# Patient Record
Sex: MALE | Race: WHITE | NOT HISPANIC OR LATINO
[De-identification: names, ages, dates, MRNs, and addresses within clinical notes are randomized per-mention and may not be internally consistent; named-entity substitution may affect disease eponyms.]

---

## 2021-02-05 ENCOUNTER — TRANSCRIPTION ENCOUNTER (OUTPATIENT)
Age: 76
End: 2021-02-05

## 2021-02-15 ENCOUNTER — TRANSCRIPTION ENCOUNTER (OUTPATIENT)
Age: 76
End: 2021-02-15

## 2023-05-03 ENCOUNTER — INPATIENT (INPATIENT)
Facility: HOSPITAL | Age: 78
LOS: 11 days | Discharge: HOME | DRG: 865 | End: 2023-05-15
Attending: HOSPITALIST | Admitting: INTERNAL MEDICINE
Payer: MEDICARE

## 2023-05-03 VITALS
TEMPERATURE: 98 F | SYSTOLIC BLOOD PRESSURE: 117 MMHG | OXYGEN SATURATION: 100 % | WEIGHT: 169.98 LBS | RESPIRATION RATE: 18 BRPM | DIASTOLIC BLOOD PRESSURE: 80 MMHG | HEART RATE: 90 BPM

## 2023-05-03 DIAGNOSIS — M10.9 GOUT, UNSPECIFIED: ICD-10-CM

## 2023-05-03 DIAGNOSIS — Z29.9 ENCOUNTER FOR PROPHYLACTIC MEASURES, UNSPECIFIED: ICD-10-CM

## 2023-05-03 DIAGNOSIS — K21.9 GASTRO-ESOPHAGEAL REFLUX DISEASE WITHOUT ESOPHAGITIS: ICD-10-CM

## 2023-05-03 DIAGNOSIS — R41.82 ALTERED MENTAL STATUS, UNSPECIFIED: ICD-10-CM

## 2023-05-03 DIAGNOSIS — L89.159 PRESSURE ULCER OF SACRAL REGION, UNSPECIFIED STAGE: ICD-10-CM

## 2023-05-03 DIAGNOSIS — N40.0 BENIGN PROSTATIC HYPERPLASIA WITHOUT LOWER URINARY TRACT SYMPTOMS: ICD-10-CM

## 2023-05-03 DIAGNOSIS — N18.9 CHRONIC KIDNEY DISEASE, UNSPECIFIED: ICD-10-CM

## 2023-05-03 DIAGNOSIS — I48.91 UNSPECIFIED ATRIAL FIBRILLATION: ICD-10-CM

## 2023-05-03 DIAGNOSIS — E78.5 HYPERLIPIDEMIA, UNSPECIFIED: ICD-10-CM

## 2023-05-03 DIAGNOSIS — F03.90 UNSPECIFIED DEMENTIA, UNSPECIFIED SEVERITY, WITHOUT BEHAVIORAL DISTURBANCE, PSYCHOTIC DISTURBANCE, MOOD DISTURBANCE, AND ANXIETY: ICD-10-CM

## 2023-05-03 DIAGNOSIS — R45.1 RESTLESSNESS AND AGITATION: ICD-10-CM

## 2023-05-03 DIAGNOSIS — K59.00 CONSTIPATION, UNSPECIFIED: ICD-10-CM

## 2023-05-03 DIAGNOSIS — D64.9 ANEMIA, UNSPECIFIED: ICD-10-CM

## 2023-05-03 LAB
ALBUMIN SERPL ELPH-MCNC: 3 G/DL — LOW (ref 3.3–5)
ALP SERPL-CCNC: 66 U/L — SIGNIFICANT CHANGE UP (ref 40–120)
ALT FLD-CCNC: 24 U/L — SIGNIFICANT CHANGE UP (ref 12–78)
ANION GAP SERPL CALC-SCNC: 7 MMOL/L — SIGNIFICANT CHANGE UP (ref 5–17)
APPEARANCE UR: CLEAR — SIGNIFICANT CHANGE UP
APTT BLD: 29.9 SEC — SIGNIFICANT CHANGE UP (ref 27.5–35.5)
AST SERPL-CCNC: 16 U/L — SIGNIFICANT CHANGE UP (ref 15–37)
BASOPHILS # BLD AUTO: 0.05 K/UL — SIGNIFICANT CHANGE UP (ref 0–0.2)
BASOPHILS NFR BLD AUTO: 0.9 % — SIGNIFICANT CHANGE UP (ref 0–2)
BILIRUB SERPL-MCNC: 0.5 MG/DL — SIGNIFICANT CHANGE UP (ref 0.2–1.2)
BILIRUB UR-MCNC: NEGATIVE — SIGNIFICANT CHANGE UP
BUN SERPL-MCNC: 33 MG/DL — HIGH (ref 7–23)
CALCIUM SERPL-MCNC: 8.8 MG/DL — SIGNIFICANT CHANGE UP (ref 8.5–10.1)
CHLORIDE SERPL-SCNC: 115 MMOL/L — HIGH (ref 96–108)
CO2 SERPL-SCNC: 20 MMOL/L — LOW (ref 22–31)
COLOR SPEC: YELLOW — SIGNIFICANT CHANGE UP
CREAT SERPL-MCNC: 2.3 MG/DL — HIGH (ref 0.5–1.3)
CRP SERPL-MCNC: 3 MG/L — SIGNIFICANT CHANGE UP
DIFF PNL FLD: ABNORMAL
EGFR: 29 ML/MIN/1.73M2 — LOW
EOSINOPHIL # BLD AUTO: 0.27 K/UL — SIGNIFICANT CHANGE UP (ref 0–0.5)
EOSINOPHIL NFR BLD AUTO: 4.6 % — SIGNIFICANT CHANGE UP (ref 0–6)
GLUCOSE SERPL-MCNC: 94 MG/DL — SIGNIFICANT CHANGE UP (ref 70–99)
GLUCOSE UR QL: NEGATIVE — SIGNIFICANT CHANGE UP
HCT VFR BLD CALC: 27.5 % — LOW (ref 39–50)
HGB BLD-MCNC: 8.6 G/DL — LOW (ref 13–17)
HPIV3 RNA SPEC QL NAA+PROBE: DETECTED
IMM GRANULOCYTES NFR BLD AUTO: 0.5 % — SIGNIFICANT CHANGE UP (ref 0–0.9)
INR BLD: 1.14 RATIO — SIGNIFICANT CHANGE UP (ref 0.88–1.16)
KETONES UR-MCNC: NEGATIVE — SIGNIFICANT CHANGE UP
LACTATE SERPL-SCNC: 1.4 MMOL/L — SIGNIFICANT CHANGE UP (ref 0.7–2)
LEUKOCYTE ESTERASE UR-ACNC: ABNORMAL
LYMPHOCYTES # BLD AUTO: 1.02 K/UL — SIGNIFICANT CHANGE UP (ref 1–3.3)
LYMPHOCYTES # BLD AUTO: 17.4 % — SIGNIFICANT CHANGE UP (ref 13–44)
MCHC RBC-ENTMCNC: 28.9 PG — SIGNIFICANT CHANGE UP (ref 27–34)
MCHC RBC-ENTMCNC: 31.3 GM/DL — LOW (ref 32–36)
MCV RBC AUTO: 92.3 FL — SIGNIFICANT CHANGE UP (ref 80–100)
MONOCYTES # BLD AUTO: 0.33 K/UL — SIGNIFICANT CHANGE UP (ref 0–0.9)
MONOCYTES NFR BLD AUTO: 5.6 % — SIGNIFICANT CHANGE UP (ref 2–14)
NEUTROPHILS # BLD AUTO: 4.17 K/UL — SIGNIFICANT CHANGE UP (ref 1.8–7.4)
NEUTROPHILS NFR BLD AUTO: 71 % — SIGNIFICANT CHANGE UP (ref 43–77)
NITRITE UR-MCNC: NEGATIVE — SIGNIFICANT CHANGE UP
NRBC # BLD: 0 /100 WBCS — SIGNIFICANT CHANGE UP (ref 0–0)
PH UR: 5 — SIGNIFICANT CHANGE UP (ref 5–8)
PLATELET # BLD AUTO: 190 K/UL — SIGNIFICANT CHANGE UP (ref 150–400)
POTASSIUM SERPL-MCNC: 3.9 MMOL/L — SIGNIFICANT CHANGE UP (ref 3.5–5.3)
POTASSIUM SERPL-SCNC: 3.9 MMOL/L — SIGNIFICANT CHANGE UP (ref 3.5–5.3)
PROCALCITONIN SERPL-MCNC: 0.07 NG/ML — SIGNIFICANT CHANGE UP
PROT SERPL-MCNC: 6.2 G/DL — SIGNIFICANT CHANGE UP (ref 6–8.3)
PROT UR-MCNC: 100
PROTHROM AB SERPL-ACNC: 13.4 SEC — SIGNIFICANT CHANGE UP (ref 10.5–13.4)
RAPID RVP RESULT: DETECTED
RBC # BLD: 2.98 M/UL — LOW (ref 4.2–5.8)
RBC # FLD: 16 % — HIGH (ref 10.3–14.5)
SARS-COV-2 RNA SPEC QL NAA+PROBE: SIGNIFICANT CHANGE UP
SODIUM SERPL-SCNC: 142 MMOL/L — SIGNIFICANT CHANGE UP (ref 135–145)
SP GR SPEC: 1.01 — SIGNIFICANT CHANGE UP (ref 1.01–1.02)
UROBILINOGEN FLD QL: NEGATIVE — SIGNIFICANT CHANGE UP
WBC # BLD: 5.87 K/UL — SIGNIFICANT CHANGE UP (ref 3.8–10.5)
WBC # FLD AUTO: 5.87 K/UL — SIGNIFICANT CHANGE UP (ref 3.8–10.5)

## 2023-05-03 PROCEDURE — 99285 EMERGENCY DEPT VISIT HI MDM: CPT

## 2023-05-03 PROCEDURE — 70450 CT HEAD/BRAIN W/O DYE: CPT | Mod: 26,MA

## 2023-05-03 PROCEDURE — 71045 X-RAY EXAM CHEST 1 VIEW: CPT | Mod: 26

## 2023-05-03 PROCEDURE — 99223 1ST HOSP IP/OBS HIGH 75: CPT | Mod: GC

## 2023-05-03 PROCEDURE — 99497 ADVNCD CARE PLAN 30 MIN: CPT | Mod: 25

## 2023-05-03 RX ORDER — COLCHICINE 0.6 MG
1 TABLET ORAL
Refills: 0 | DISCHARGE

## 2023-05-03 RX ORDER — TAMSULOSIN HYDROCHLORIDE 0.4 MG/1
1 CAPSULE ORAL
Refills: 0 | DISCHARGE

## 2023-05-03 RX ORDER — COLCHICINE 0.6 MG
0.6 TABLET ORAL DAILY
Refills: 0 | Status: DISCONTINUED | OUTPATIENT
Start: 2023-05-03 | End: 2023-05-15

## 2023-05-03 RX ORDER — RISPERIDONE 4 MG/1
0.25 TABLET ORAL
Refills: 0 | Status: DISCONTINUED | OUTPATIENT
Start: 2023-05-03 | End: 2023-05-06

## 2023-05-03 RX ORDER — ASCORBIC ACID 60 MG
1 TABLET,CHEWABLE ORAL
Refills: 0 | DISCHARGE

## 2023-05-03 RX ORDER — ALLOPURINOL 300 MG
1 TABLET ORAL
Refills: 0 | DISCHARGE

## 2023-05-03 RX ORDER — METOPROLOL TARTRATE 50 MG
1 TABLET ORAL
Refills: 0 | DISCHARGE

## 2023-05-03 RX ORDER — SODIUM CHLORIDE 9 MG/ML
1000 INJECTION, SOLUTION INTRAVENOUS
Refills: 0 | Status: DISCONTINUED | OUTPATIENT
Start: 2023-05-03 | End: 2023-05-05

## 2023-05-03 RX ORDER — ALPRAZOLAM 0.25 MG
0.25 TABLET ORAL AT BEDTIME
Refills: 0 | Status: DISCONTINUED | OUTPATIENT
Start: 2023-05-03 | End: 2023-05-07

## 2023-05-03 RX ORDER — MEMANTINE HYDROCHLORIDE 10 MG/1
10 TABLET ORAL DAILY
Refills: 0 | Status: DISCONTINUED | OUTPATIENT
Start: 2023-05-03 | End: 2023-05-15

## 2023-05-03 RX ORDER — MAGNESIUM HYDROXIDE 400 MG/1
1 TABLET, CHEWABLE ORAL
Refills: 0 | DISCHARGE

## 2023-05-03 RX ORDER — SERTRALINE 25 MG/1
50 TABLET, FILM COATED ORAL DAILY
Refills: 0 | Status: DISCONTINUED | OUTPATIENT
Start: 2023-05-03 | End: 2023-05-15

## 2023-05-03 RX ORDER — SENNA PLUS 8.6 MG/1
1 TABLET ORAL
Refills: 0 | DISCHARGE

## 2023-05-03 RX ORDER — SENNA PLUS 8.6 MG/1
1 TABLET ORAL AT BEDTIME
Refills: 0 | Status: DISCONTINUED | OUTPATIENT
Start: 2023-05-03 | End: 2023-05-15

## 2023-05-03 RX ORDER — ACETAMINOPHEN 500 MG
650 TABLET ORAL EVERY 6 HOURS
Refills: 0 | Status: DISCONTINUED | OUTPATIENT
Start: 2023-05-03 | End: 2023-05-15

## 2023-05-03 RX ORDER — NYSTATIN CREAM 100000 [USP'U]/G
1 CREAM TOPICAL
Refills: 0 | Status: DISCONTINUED | OUTPATIENT
Start: 2023-05-03 | End: 2023-05-06

## 2023-05-03 RX ORDER — MAGNESIUM HYDROXIDE 400 MG/1
30 TABLET, CHEWABLE ORAL DAILY
Refills: 0 | Status: DISCONTINUED | OUTPATIENT
Start: 2023-05-03 | End: 2023-05-15

## 2023-05-03 RX ORDER — TAMSULOSIN HYDROCHLORIDE 0.4 MG/1
0.4 CAPSULE ORAL AT BEDTIME
Refills: 0 | Status: DISCONTINUED | OUTPATIENT
Start: 2023-05-03 | End: 2023-05-15

## 2023-05-03 RX ORDER — FAMOTIDINE 10 MG/ML
1 INJECTION INTRAVENOUS
Refills: 0 | DISCHARGE

## 2023-05-03 RX ORDER — METOPROLOL TARTRATE 50 MG
100 TABLET ORAL DAILY
Refills: 0 | Status: DISCONTINUED | OUTPATIENT
Start: 2023-05-03 | End: 2023-05-15

## 2023-05-03 RX ORDER — SODIUM CHLORIDE 9 MG/ML
2400 INJECTION, SOLUTION INTRAVENOUS ONCE
Refills: 0 | Status: COMPLETED | OUTPATIENT
Start: 2023-05-03 | End: 2023-05-03

## 2023-05-03 RX ORDER — MEMANTINE HYDROCHLORIDE 10 MG/1
1 TABLET ORAL
Refills: 0 | DISCHARGE

## 2023-05-03 RX ORDER — ALLOPURINOL 300 MG
300 TABLET ORAL DAILY
Refills: 0 | Status: DISCONTINUED | OUTPATIENT
Start: 2023-05-03 | End: 2023-05-15

## 2023-05-03 RX ORDER — SERTRALINE 25 MG/1
1 TABLET, FILM COATED ORAL
Refills: 0 | DISCHARGE

## 2023-05-03 RX ORDER — ASCORBIC ACID 60 MG
500 TABLET,CHEWABLE ORAL
Refills: 0 | Status: DISCONTINUED | OUTPATIENT
Start: 2023-05-03 | End: 2023-05-15

## 2023-05-03 RX ORDER — HEPARIN SODIUM 5000 [USP'U]/ML
5000 INJECTION INTRAVENOUS; SUBCUTANEOUS EVERY 8 HOURS
Refills: 0 | Status: DISCONTINUED | OUTPATIENT
Start: 2023-05-03 | End: 2023-05-05

## 2023-05-03 RX ORDER — VIBEGRON 75 MG/1
1 TABLET, FILM COATED ORAL
Refills: 0 | DISCHARGE

## 2023-05-03 RX ORDER — CLOTRIMAZOLE AND BETAMETHASONE DIPROPIONATE 10; .5 MG/G; MG/G
1 CREAM TOPICAL
Refills: 0 | Status: DISCONTINUED | OUTPATIENT
Start: 2023-05-03 | End: 2023-05-06

## 2023-05-03 RX ORDER — FAMOTIDINE 10 MG/ML
20 INJECTION INTRAVENOUS DAILY
Refills: 0 | Status: DISCONTINUED | OUTPATIENT
Start: 2023-05-03 | End: 2023-05-15

## 2023-05-03 RX ORDER — ATORVASTATIN CALCIUM 80 MG/1
10 TABLET, FILM COATED ORAL AT BEDTIME
Refills: 0 | Status: DISCONTINUED | OUTPATIENT
Start: 2023-05-03 | End: 2023-05-15

## 2023-05-03 RX ORDER — NYSTATIN CREAM 100000 [USP'U]/G
1 CREAM TOPICAL
Refills: 0 | DISCHARGE

## 2023-05-03 RX ADMIN — HEPARIN SODIUM 5000 UNIT(S): 5000 INJECTION INTRAVENOUS; SUBCUTANEOUS at 22:58

## 2023-05-03 RX ADMIN — Medication 0.25 MILLIGRAM(S): at 22:58

## 2023-05-03 RX ADMIN — TAMSULOSIN HYDROCHLORIDE 0.4 MILLIGRAM(S): 0.4 CAPSULE ORAL at 22:59

## 2023-05-03 RX ADMIN — Medication 500 MILLIGRAM(S): at 18:31

## 2023-05-03 RX ADMIN — SODIUM CHLORIDE 2400 MILLILITER(S): 9 INJECTION, SOLUTION INTRAVENOUS at 08:57

## 2023-05-03 RX ADMIN — SENNA PLUS 1 TABLET(S): 8.6 TABLET ORAL at 22:59

## 2023-05-03 RX ADMIN — ATORVASTATIN CALCIUM 10 MILLIGRAM(S): 80 TABLET, FILM COATED ORAL at 22:59

## 2023-05-03 RX ADMIN — RISPERIDONE 0.25 MILLIGRAM(S): 4 TABLET ORAL at 18:00

## 2023-05-03 NOTE — ED PROVIDER NOTE - DIFFERENTIAL DIAGNOSIS
Differential diagnosis includes worsening dementia/sepsis/intracerebral process such as subdural hematoma epidural hematoma subarachnoid hemorrhage/toxic metabolic disturbance/electrolyte abnormality such as hyponatremia. Differential Diagnosis

## 2023-05-03 NOTE — H&P ADULT - NSHPPHYSICALEXAM_GEN_ALL_CORE
T(C): 36.9 (05-03-23 @ 15:29), Max: 36.9 (05-03-23 @ 15:29)  HR: 71 (05-03-23 @ 15:29) (71 - 90)  BP: 131/73 (05-03-23 @ 15:29) (117/80 - 131/73)  RR: 18 (05-03-23 @ 15:29) (18 - 18)  SpO2: 100% (05-03-23 @ 15:29) (100% - 100%)    Physical Exam:   GENERAL: eldelry male, NAD  HEENT:  conjunctiva & sclera clear; hearing grossly intact, dry mucous membranes  RESPIRATORY: CTA B/L, no wheezing, rales, rhonchi or rubs  CARDIOVASCULAR: S1&S2, irregularly irregular, no murmurs or gallops  ABDOMEN: soft, non-tender, non-distended, + Bowel sounds x4 quadrants, no guarding, rebound or rigidity  MUSCULOSKELETAL:  BL LE edema, erythema L anterior shin w/increased warmth  VASCULAR: Radial pulses 2+ bilaterally  SKIN: scattered ecchymoses BL arms and legs  NEUROLOGIC: AA&O X1 to name self, moving all 4 extremities spontaneously  Psych: labile mood, tearful at times

## 2023-05-03 NOTE — ED ADULT NURSE NOTE - OBJECTIVE STATEMENT
pt. is Aox1. Does respond to name. brought in via ambulance from [facility] for increased agitation. Pt. observed with some scabs b/l arms and legs. pending MD orders.

## 2023-05-03 NOTE — H&P ADULT - CONVERSATION DETAILS
Wife Melissa and Niece Sydnee at bedside confirm pts code status of DNR/DNI as outlined in MOLST form from facility. They also mentioned they wanted to further discuss options for palliative vs CMO 2/2 pts rapid and progressive decline in health in addition to his advanced dementia. Wife Melissa and Niece Sydnee at bedside confirm pts code status of DNR/DNI as outlined in MOLST form from facility. They also mentioned they wanted to further discuss options for palliative vs comfort measures 2/2 pts rapid and progressive decline in health in addition to his advanced dementia.    Patients code status was discussed with patients wife Melissa at bedside. Goals of care were discussed with patient including the importance of having an advance directive and Health Care agent. Goals of care discussed included need for possible intubation and CPR if patient clinically deteriorates. Family was informed about the role of a MOLST form.     Patient is DNR and DNI   16 minutes were spent discussing advanced care planning and this was separate from management of patients medical problems.

## 2023-05-03 NOTE — H&P ADULT - NSICDXPASTMEDICALHX_GEN_ALL_CORE_FT
PAST MEDICAL HISTORY:  Arthritis     Atrial fibrillation     Dementia     Gout     H/o Cerebral aneurysm     h/o HTN (hypertension)     HLD (hyperlipidemia)     HTN (hypertension)     Obesity     Prostate enlargement     Schizophrenia

## 2023-05-03 NOTE — H&P ADULT - PROBLEM SELECTOR PLAN 4
hx of renal osteodystrophy  Cr 2.3 on admission, unknown baseline  Pt w/ poor PO intake PTA   IVF w/LR @ 50cc/hr X16h  Monitor BMP  Avoid nephrotoxic medications as able  Consider nephrology consult for worsening renal function hx of renal osteodystrophy  Cr 2.3 on admission, unknown baseline  Pt w/ poor PO intake PTA   IVF w/LR @ 50cc/hr X16h  Monitor BMP  Avoid nephrotoxic medications as able

## 2023-05-03 NOTE — H&P ADULT - HISTORY OF PRESENT ILLNESS
77-year-old white male with history of dementia hypertension atrial fibrillation s/p Watchman 2020 anemia BPH hyperlipidemia GERD gout schizophrenia anxiety disorder CKD as well as renal osteodystrophy sent to the emergency department here at [facility] today for evaluation of "altered mental status".  Per transfer papers from [facility] as well as from questioning EMS who transported patient here patient has been more aggressive in terms of behavior and they felt he may be in danger to himself and potentially others.  Wife got called by the facility this AM and heard pt screaming "everyone is dead here" and threatening to call the police. Niece at bedside also reports accounts over the wknd of pt undressing himself including his diaper and smearing feces around the facility. Family reports ICU admission at [facility] march 2023 2/2 sepsis. They also recount rapid and progressive decline in health and hygiene w/>70lb weight loss in recent months and worsening dementia. No additional subjective history is available at this time secondary to patient's baseline dementia.  Pt is currently AAOX1 to self and tearful saying he is going to die and asking for his wife.    In the ED,  Vitals: 97.9F rectal, HR 90 bpm, NIBP 117/80, RR 18 breaths/min, SpO2 100% on RA  labs significant for hgb 8.6, Cr 2.3  UA: neg for UTI  + parainfluenza  s/p 2.4L LR bolus  CT head non con: Anterior parasellar aneurysm coil. No acute hemorrhage, extra-axial collection or mass effect. Bilateral maxillary sinus air-fluid levels.   CXR: No acute finding or change.  ECG: AFib 78ms, Qtc 461ms

## 2023-05-03 NOTE — H&P ADULT - ATTENDING COMMENTS
77-year-old white male with history of dementia hypertension atrial fibrillation s/p Watchman 2020 anemia BPH hyperlipidemia GERD gout schizophrenia anxiety disorder CKD as well as renal osteodystrophy admitted for "altered mental status" /agitation , CKD and anemia.    HPI as above    T(C): 36.9 (05-03-23 @ 15:29), Max: 36.9 (05-03-23 @ 15:29)  HR: 71 (05-03-23 @ 15:29) (71 - 90)  BP: 131/73 (05-03-23 @ 15:29) (117/80 - 131/73)  RR: 18 (05-03-23 @ 15:29) (18 - 18)  SpO2: 100% (05-03-23 @ 15:29) (100% - 100%)  Wt(kg): --    Physical Exam:   GENERAL: well-groomed,  NAD  HEENT: head NC/AT; EOM intact, PERRLA, conjunctiva & sclera clear; hearing grossly intact, dry mucous membranes  NECK: supple, no JVD  RESPIRATORY: CTA B/L, no wheezing, rales, rhonchi or rubs  CARDIOVASCULAR: S1&S2, irreg irreg, no murmurs or gallops  ABDOMEN: soft, non-tender, non-distended, + Bowel sounds x4 quadrants, no guarding, rebound or rigidity  MUSCULOSKELETAL:  b/l LE edema trace to 1+  LYMPH: no cervical lymphadenopathy  VASCULAR: Radial pulses 2+ bilaterally, no varicose veins   SKIN: warm and dry, color normal  NEUROLOGIC: AA&O X1, speech fluent but slow, moving all extremities    Plan: Admit to medicine service  -has parainfluenza virus, do not suspect bacterial infection  -monitor WBC count and for fever  -family interested in hospice evaluation.   -patient is DNR and DNI. Decision maker is patients wife at this time.   monitor Hgb and renal fxn  remainder as above

## 2023-05-03 NOTE — H&P ADULT - NSHPSOCIALHISTORY_GEN_ALL_CORE
ADLs: ambulates with walker  Diet: soft and bite sized  Alcohol Use: former  Tobacco Use: former  Recreational Drug Use: denies ADLs: ambulates with walker  Diet: soft and bite sized  Alcohol Use: former  Tobacco Use: former  Recreational Drug Use: denies    patient cannot provide family hx

## 2023-05-03 NOTE — H&P ADULT - ASSESSMENT
77-year-old white male with history of dementia hypertension atrial fibrillation s/p Watchman 2020 anemia BPH hyperlipidemia GERD gout schizophrenia anxiety disorder CKD as well as renal osteodystrophy admitted for "altered mental status" /agitation , CKD and anemia.

## 2023-05-03 NOTE — PATIENT PROFILE ADULT - FALL HARM RISK - HARM RISK INTERVENTIONS
Assistance with ambulation/Assistance OOB with selected safe patient handling equipment/Communicate Risk of Fall with Harm to all staff/Discuss with provider need for PT consult/Monitor for mental status changes/Monitor gait and stability/Move patient closer to nurses' station/Provide patient with walking aids - walker, cane, crutches/Reinforce activity limits and safety measures with patient and family/Reorient to person, place and time as needed/Tailored Fall Risk Interventions/Toileting schedule using arm’s reach rule for commode and bathroom/Use of alarms - bed, chair and/or voice tab/Visual Cue: Yellow wristband and red socks/Bed in lowest position, wheels locked, appropriate side rails in place/Call bell, personal items and telephone in reach/Instruct patient to call for assistance before getting out of bed or chair/Non-slip footwear when patient is out of bed/[place] to call system/Physically safe environment - no spills, clutter or unnecessary equipment/Purposeful Proactive Rounding/Room/bathroom lighting operational, light cord in reach

## 2023-05-03 NOTE — ED PROVIDER NOTE - CONSTITUTIONAL, MLM
Well-developed well-nourished though somewhat disheveled elderly white male in no apparent distress. normal...

## 2023-05-03 NOTE — ED PROVIDER NOTE - TIMING
The patient has been examined and the H&P has been reviewed:    I concur with the findings and no changes have occurred since H&P was written.    Surgery risks, benefits and alternative options discussed and understood by patient/family.          Active Hospital Problems    Diagnosis  POA    *Incisional hernia [K43.2]  Yes      Resolved Hospital Problems   No resolved problems to display.      worsening

## 2023-05-03 NOTE — ED ADULT NURSE REASSESSMENT NOTE - NS ED NURSE REASSESS COMMENT FT1
pt. Is observed in bed resting, calm. No attempts in getting out of bed.  Fluids infusing, CM/ in place. Pending lab and ragiology results.

## 2023-05-03 NOTE — H&P ADULT - PROBLEM SELECTOR PLAN 5
normocytic anemia  hgb 8.6 on admission likely 2/2 chronic disease  baseline hgb unknown  f/up iron studies, b12, folate  ck fobt  f/up repeat H/H. monitor for dilutional results given recent fluid administration  maintain active type and screen  no reported melena, hematochezia, hematemesis. Pt has a hx of hematuria, though not known to have prior to admission  monitor for s/s anemia and occult bleeding normocytic anemia  hgb 8.6 on admission likely 2/2 chronic disease vs hematuria on UA  baseline hgb unknown  f/up iron studies, b12, folate  ck fobt  f/up repeat H/H. monitor for dilutional results given recent fluid administration  maintain active type and screen  monitor for s/s anemia and occult bleeding normocytic anemia  hgb 8.6 on admission likely 2/2 chronic disease vs hematuria on UA  baseline hgb unknown  f/up iron studies, b12, folate  check fobt  f/up repeat H/H. monitor for dilutional results given recent fluid administration  maintain active type and screen  monitor for s/s anemia and occult bleeding

## 2023-05-03 NOTE — H&P ADULT - NSICDXPASTSURGICALHX_GEN_ALL_CORE_FT
PAST SURGICAL HISTORY:  h/o Arthroscopy left knee 2004     h/o Arthroscopy right knee 2007     h/o Cerebral aneurysm coiling 2/18/2011 and 7/15/2011 [facility]    No significant past surgical history     total knee arthroplasty left 3/5/2012

## 2023-05-03 NOTE — H&P ADULT - PROBLEM SELECTOR PLAN 1
hx of advanced dementia, schizophrenia, anxiety. pt tearful at bedside though calm AAOX1 to self  parainfluenza +, isolation precautions  Infectious process likely also contributing to mental status  CT head non con: Anterior parasellar aneurysm coil. No acute hemorrhage, extra-axial collection or mass effect. Bilateral maxillary sinus air-fluid levels.   CXR: No acute finding or change.  UA: neg for uTI  f/up blood cx, ucx  ECG: AFib 78ms, Qtc 461ms on admission  continue home sertraline qd and alprazolam qd for anxiety  continue home risperidone for schizophrenia  family reports rapid and progressive overall deterioration w/significant weight loss - nutrition consult, f/u recs  fall and aspiration precautions  Psychiatry Dr. Shepard consulted, f/u recs hx of advanced dementia, schizophrenia, anxiety. pt tearful at bedside though calm AAOX1 to self  parainfluenza +, isolation precautions  Viral infection process likely also contributing to mental status  CT head non con: Anterior parasellar aneurysm coil. No acute hemorrhage, extra-axial collection or mass effect. Bilateral maxillary sinus air-fluid levels.   do not suspect bacterial infection  CXR: No acute finding or change.  UA: neg for uTI  f/up blood cx, ucx  ECG: AFib 78ms, Qtc 461ms on admission  continue home sertraline qd and alprazolam qd for anxiety  continue home risperidone for schizophrenia  family reports rapid and progressive overall deterioration w/significant weight loss - nutrition consult, f/u recs  fall and aspiration precautions  Psychiatry Dr. Shepard consulted, f/u recs

## 2023-05-03 NOTE — ED PROVIDER NOTE - OBJECTIVE STATEMENT
Patient is a 77-year-old white male with history of dementia hypertension atrial fibrillation anemia BPH hyperlipidemia GERD gout schizophrenia anxiety disorder as well as renal osteodystrophy sent to the emergency department here at [facility] today for evaluation of "altered mental status".  Per transfer papers from [facility] as well as from questioning EMS who transported patient here patient has been more aggressive in terms of behavior and they felt he may be in danger to himself and potentially others.  No additional history is available at this time secondary to patient's baseline dementia.

## 2023-05-03 NOTE — ED PROVIDER NOTE - NSICDXPASTMEDICALHX_GEN_ALL_CORE_FT
PAST MEDICAL HISTORY:  Atrial fibrillation     Dementia     HTN (hypertension)     Schizophrenia

## 2023-05-03 NOTE — H&P ADULT - PROBLEM SELECTOR PLAN 11
per facility documentation, pt w/pressure ulcer of sacral region  follows outpt w/Dr. Nieto  wound care consult, f/u recs  f/up blood cx

## 2023-05-03 NOTE — H&P ADULT - PROBLEM SELECTOR PLAN 10
continue home senna qd and magnesium hydroxide and dulcolax PRN  per facility documentation, pt needs fleet enema at times  continue to evaluate

## 2023-05-03 NOTE — H&P ADULT - PROBLEM SELECTOR PLAN 3
ECG: AFib 78ms, Qtc 461ms on admission  continue home metoprolol w/hold parameters for rate control  not on AC given s/p watchman procedure  Monitor and replete electrolytes

## 2023-05-03 NOTE — H&P ADULT - PROBLEM SELECTOR PLAN 6
continue home tamsulosin and gemtesa  wife aware that we do not carry gemtesa and will bring from home

## 2023-05-03 NOTE — ED PROVIDER NOTE - CONSIDERATION OF ADMISSION OBSERVATION
Consideration of Admission/Observation We will consider escalating care to include admission if patient does not respond as expected to ED evaluation and therapy and/or after results of labs and imaging necessitate acute care hospitalization.

## 2023-05-03 NOTE — ED PROVIDER NOTE - CLINICAL SUMMARY MEDICAL DECISION MAKING FREE TEXT BOX
Worsening alteration in mental status include agitation and aggressive behavior requiring thorough evaluation labs imaging and IV fluids will also need psychiatry consultation with Dr. Shepard.

## 2023-05-03 NOTE — ED PROVIDER NOTE - PROGRESS NOTE DETAILS
Patient was evaluated by psychiatrist Dr. Daniel patient admitted for observation as well as continued treatment for optimization of medication for agitation/aggressive behavior.

## 2023-05-04 DIAGNOSIS — J12.9 VIRAL PNEUMONIA, UNSPECIFIED: ICD-10-CM

## 2023-05-04 DIAGNOSIS — G93.41 METABOLIC ENCEPHALOPATHY: ICD-10-CM

## 2023-05-04 LAB
ANION GAP SERPL CALC-SCNC: 4 MMOL/L — LOW (ref 5–17)
BASOPHILS # BLD AUTO: 0.05 K/UL — SIGNIFICANT CHANGE UP (ref 0–0.2)
BASOPHILS NFR BLD AUTO: 0.9 % — SIGNIFICANT CHANGE UP (ref 0–2)
BUN SERPL-MCNC: 26 MG/DL — HIGH (ref 7–23)
CALCIUM SERPL-MCNC: 8.5 MG/DL — SIGNIFICANT CHANGE UP (ref 8.5–10.1)
CHLORIDE SERPL-SCNC: 117 MMOL/L — HIGH (ref 96–108)
CO2 SERPL-SCNC: 23 MMOL/L — SIGNIFICANT CHANGE UP (ref 22–31)
CREAT SERPL-MCNC: 1.9 MG/DL — HIGH (ref 0.5–1.3)
EGFR: 36 ML/MIN/1.73M2 — LOW
EOSINOPHIL # BLD AUTO: 0.26 K/UL — SIGNIFICANT CHANGE UP (ref 0–0.5)
EOSINOPHIL NFR BLD AUTO: 4.9 % — SIGNIFICANT CHANGE UP (ref 0–6)
FERRITIN SERPL-MCNC: 177 NG/ML — SIGNIFICANT CHANGE UP (ref 30–400)
FOLATE SERPL-MCNC: 6.5 NG/ML — SIGNIFICANT CHANGE UP
GLUCOSE SERPL-MCNC: 97 MG/DL — SIGNIFICANT CHANGE UP (ref 70–99)
HCT VFR BLD CALC: 24.3 % — LOW (ref 39–50)
HCV AB S/CO SERPL IA: 0.07 S/CO — SIGNIFICANT CHANGE UP (ref 0–0.99)
HCV AB SERPL-IMP: SIGNIFICANT CHANGE UP
HGB BLD-MCNC: 7.6 G/DL — LOW (ref 13–17)
IMM GRANULOCYTES NFR BLD AUTO: 0.4 % — SIGNIFICANT CHANGE UP (ref 0–0.9)
LYMPHOCYTES # BLD AUTO: 1.54 K/UL — SIGNIFICANT CHANGE UP (ref 1–3.3)
LYMPHOCYTES # BLD AUTO: 28.8 % — SIGNIFICANT CHANGE UP (ref 13–44)
MCHC RBC-ENTMCNC: 28.9 PG — SIGNIFICANT CHANGE UP (ref 27–34)
MCHC RBC-ENTMCNC: 31.3 GM/DL — LOW (ref 32–36)
MCV RBC AUTO: 92.4 FL — SIGNIFICANT CHANGE UP (ref 80–100)
MONOCYTES # BLD AUTO: 0.47 K/UL — SIGNIFICANT CHANGE UP (ref 0–0.9)
MONOCYTES NFR BLD AUTO: 8.8 % — SIGNIFICANT CHANGE UP (ref 2–14)
NEUTROPHILS # BLD AUTO: 3 K/UL — SIGNIFICANT CHANGE UP (ref 1.8–7.4)
NEUTROPHILS NFR BLD AUTO: 56.2 % — SIGNIFICANT CHANGE UP (ref 43–77)
NRBC # BLD: 0 /100 WBCS — SIGNIFICANT CHANGE UP (ref 0–0)
PLATELET # BLD AUTO: 179 K/UL — SIGNIFICANT CHANGE UP (ref 150–400)
POTASSIUM SERPL-MCNC: 3.7 MMOL/L — SIGNIFICANT CHANGE UP (ref 3.5–5.3)
POTASSIUM SERPL-SCNC: 3.7 MMOL/L — SIGNIFICANT CHANGE UP (ref 3.5–5.3)
RBC # BLD: 2.63 M/UL — LOW (ref 4.2–5.8)
RBC # BLD: 2.63 M/UL — LOW (ref 4.2–5.8)
RBC # FLD: 16.1 % — HIGH (ref 10.3–14.5)
RETICS #: 35.2 K/UL — SIGNIFICANT CHANGE UP (ref 25–125)
RETICS/RBC NFR: 1.3 % — SIGNIFICANT CHANGE UP (ref 0.5–2.5)
SODIUM SERPL-SCNC: 144 MMOL/L — SIGNIFICANT CHANGE UP (ref 135–145)
TRANSFERRIN SERPL-MCNC: 170 MG/DL — LOW (ref 200–360)
VIT B12 SERPL-MCNC: 779 PG/ML — SIGNIFICANT CHANGE UP (ref 232–1245)
WBC # BLD: 5.34 K/UL — SIGNIFICANT CHANGE UP (ref 3.8–10.5)
WBC # FLD AUTO: 5.34 K/UL — SIGNIFICANT CHANGE UP (ref 3.8–10.5)

## 2023-05-04 PROCEDURE — 99221 1ST HOSP IP/OBS SF/LOW 40: CPT

## 2023-05-04 PROCEDURE — 99233 SBSQ HOSP IP/OBS HIGH 50: CPT

## 2023-05-04 RX ORDER — LANOLIN ALCOHOL/MO/W.PET/CERES
10 CREAM (GRAM) TOPICAL ONCE
Refills: 0 | Status: COMPLETED | OUTPATIENT
Start: 2023-05-04 | End: 2023-05-04

## 2023-05-04 RX ADMIN — Medication 100 MILLIGRAM(S): at 06:46

## 2023-05-04 RX ADMIN — RISPERIDONE 0.25 MILLIGRAM(S): 4 TABLET ORAL at 19:09

## 2023-05-04 RX ADMIN — HEPARIN SODIUM 5000 UNIT(S): 5000 INJECTION INTRAVENOUS; SUBCUTANEOUS at 13:20

## 2023-05-04 RX ADMIN — Medication 1 TABLET(S): at 13:18

## 2023-05-04 RX ADMIN — RISPERIDONE 0.25 MILLIGRAM(S): 4 TABLET ORAL at 06:47

## 2023-05-04 RX ADMIN — Medication 0.6 MILLIGRAM(S): at 13:18

## 2023-05-04 RX ADMIN — Medication 500 MILLIGRAM(S): at 19:09

## 2023-05-04 RX ADMIN — Medication 500 MILLIGRAM(S): at 06:46

## 2023-05-04 RX ADMIN — FAMOTIDINE 20 MILLIGRAM(S): 10 INJECTION INTRAVENOUS at 13:18

## 2023-05-04 RX ADMIN — HEPARIN SODIUM 5000 UNIT(S): 5000 INJECTION INTRAVENOUS; SUBCUTANEOUS at 06:47

## 2023-05-04 RX ADMIN — CLOTRIMAZOLE AND BETAMETHASONE DIPROPIONATE 1 APPLICATION(S): 10; .5 CREAM TOPICAL at 19:09

## 2023-05-04 RX ADMIN — SERTRALINE 50 MILLIGRAM(S): 25 TABLET, FILM COATED ORAL at 13:19

## 2023-05-04 RX ADMIN — NYSTATIN CREAM 1 APPLICATION(S): 100000 CREAM TOPICAL at 19:09

## 2023-05-04 RX ADMIN — CLOTRIMAZOLE AND BETAMETHASONE DIPROPIONATE 1 APPLICATION(S): 10; .5 CREAM TOPICAL at 09:53

## 2023-05-04 RX ADMIN — Medication 300 MILLIGRAM(S): at 13:19

## 2023-05-04 RX ADMIN — Medication 0.25 MILLIGRAM(S): at 11:03

## 2023-05-04 RX ADMIN — NYSTATIN CREAM 1 APPLICATION(S): 100000 CREAM TOPICAL at 06:47

## 2023-05-04 RX ADMIN — MEMANTINE HYDROCHLORIDE 10 MILLIGRAM(S): 10 TABLET ORAL at 13:19

## 2023-05-04 NOTE — DIETITIAN INITIAL EVALUATION ADULT - PERTINENT MEDS FT
MEDICATIONS  (STANDING):  allopurinol 300 milliGRAM(s) Oral daily  ALPRAZolam 0.25 milliGRAM(s) Oral at bedtime  ascorbic acid 500 milliGRAM(s) Oral two times a day  atorvastatin 10 milliGRAM(s) Oral at bedtime  clotrimazole/betamethasone Cream 1 Application(s) Topical two times a day  colchicine 0.6 milliGRAM(s) Oral daily  famotidine    Tablet 20 milliGRAM(s) Oral daily  heparin   Injectable 5000 Unit(s) SubCutaneous every 8 hours  lactated ringers. 1000 milliLiter(s) (50 mL/Hr) IV Continuous <Continuous>  memantine 10 milliGRAM(s) Oral daily  metoprolol succinate  milliGRAM(s) Oral daily  multivitamin 1 Tablet(s) Oral daily  nystatin Cream 1 Application(s) Topical two times a day  risperiDONE   Tablet 0.25 milliGRAM(s) Oral two times a day  senna 1 Tablet(s) Oral at bedtime  sertraline 50 milliGRAM(s) Oral daily  tamsulosin 0.4 milliGRAM(s) Oral at bedtime    MEDICATIONS  (PRN):  acetaminophen     Tablet .. 650 milliGRAM(s) Oral every 6 hours PRN Mild Pain (1 - 3)  bisacodyl Suppository 10 milliGRAM(s) Rectal daily PRN Constipation  magnesium hydroxide Suspension 30 milliLiter(s) Oral daily PRN Constipation

## 2023-05-04 NOTE — DIETITIAN INITIAL EVALUATION ADULT - PROBLEM SELECTOR PLAN 5
normocytic anemia  hgb 8.6 on admission likely 2/2 chronic disease vs hematuria on UA  baseline hgb unknown  f/up iron studies, b12, folate  check fobt  f/up repeat H/H. monitor for dilutional results given recent fluid administration  maintain active type and screen  monitor for s/s anemia and occult bleeding

## 2023-05-04 NOTE — CARE COORDINATION ASSESSMENT. - OTHER PERTINENT DISCHARGE PLANNING INFORMATION:
pt is a 77 year old man, h/o dementia admitted from [facility] with altered mental status. sw spoke with pts wife, workers role discussed. as per pts wife pt was admitted to [facility] approx 5 wks ago after surgery at [facility]. pts wife would like for pt to return to [facility] when stable. sw to follow for dc planning.

## 2023-05-04 NOTE — PROGRESS NOTE ADULT - SUBJECTIVE AND OBJECTIVE BOX
Patient is a 77y old  Male who presents with a chief complaint of agitation, CKD, anemia (03 May 2023 15:11)      INTERVAL HPI/OVERNIGHT EVENTS: Patient seen and examined at bedside. No overnight events.  AO1 unable to obtain ROS due to mental status    MEDICATIONS  (STANDING):  allopurinol 300 milliGRAM(s) Oral daily  ALPRAZolam 0.25 milliGRAM(s) Oral at bedtime  ascorbic acid 500 milliGRAM(s) Oral two times a day  atorvastatin 10 milliGRAM(s) Oral at bedtime  clotrimazole/betamethasone Cream 1 Application(s) Topical two times a day  colchicine 0.6 milliGRAM(s) Oral daily  famotidine    Tablet 20 milliGRAM(s) Oral daily  heparin   Injectable 5000 Unit(s) SubCutaneous every 8 hours  lactated ringers. 1000 milliLiter(s) (50 mL/Hr) IV Continuous <Continuous>  memantine 10 milliGRAM(s) Oral daily  metoprolol succinate  milliGRAM(s) Oral daily  multivitamin 1 Tablet(s) Oral daily  nystatin Cream 1 Application(s) Topical two times a day  risperiDONE   Tablet 0.25 milliGRAM(s) Oral two times a day  senna 1 Tablet(s) Oral at bedtime  sertraline 50 milliGRAM(s) Oral daily  tamsulosin 0.4 milliGRAM(s) Oral at bedtime    MEDICATIONS  (PRN):  acetaminophen     Tablet .. 650 milliGRAM(s) Oral every 6 hours PRN Mild Pain (1 - 3)  bisacodyl Suppository 10 milliGRAM(s) Rectal daily PRN Constipation  magnesium hydroxide Suspension 30 milliLiter(s) Oral daily PRN Constipation      Allergies    No Known Allergies    Intolerances        Vital Signs Last 24 Hrs  T(C): 36.7 (04 May 2023 06:00), Max: 36.9 (03 May 2023 15:29)  T(F): 98 (04 May 2023 06:00), Max: 98.4 (03 May 2023 15:29)  HR: 80 (04 May 2023 06:00) (71 - 91)  BP: 130/77 (04 May 2023 06:00) (128/75 - 149/91)  BP(mean): --  RR: 18 (04 May 2023 06:00) (18 - 18)  SpO2: 96% (04 May 2023 06:00) (95% - 100%)    Parameters below as of 04 May 2023 06:00  Patient On (Oxygen Delivery Method): room air      I&O's Summary    03 May 2023 07:01  -  04 May 2023 07:00  --------------------------------------------------------  IN: 0 mL / OUT: 700 mL / NET: -700 mL          PHYSICAL EXAM:  GENERAL: NAD  HEENT:  AT/NC, anicteric, moist mucous membranes, EOMI, PERRL, conjunctiva and sclera clear  CHEST/LUNG: mildly diminished breath sounds at bases overall CTA b/l, no rales, wheezes, or rhonchi,  normal respiratory effort, no intercostal retractions  HEART:  RRR, S1, S2, no murmurs; no pitting edema  ABDOMEN:  BS+, soft, nontender, nondistended  MSK/EXTREMITIES: palpable peripheral pulses, no clubbing or cyanosis  NERVOUS SYSTEM: A&Ox1, grossly moves all extremities, follows simple commands  PSYCH: tearful affect, Alert & Awake; poor judgement      LABS: Personally reviewed                        7.6    5.34  )-----------( 179      ( 04 May 2023 05:52 )             24.3         144  |  117  |  26  ----------------------------<  97  3.7   |  23  |  1.90    Ca    8.5      04 May 2023 05:52    TPro  6.2  /  Alb  3.0  /  TBili  0.5  /  DBili  x   /  AST  16  /  ALT  24  /  AlkPhos  66  -          PT/INR - ( 03 May 2023 08:30 )   PT: 13.4 sec;   INR: 1.14 ratio         PTT - ( 03 May 2023 08:30 )  PTT:29.9 sec  Lactate, Blood: 1.4 mmol/L ( @ 08:30)    Procalcitonin, Serum: 0.07 ng/mL (23 @ 08:30)                          Urinalysis Basic - ( 03 May 2023 10:40 )    Color: Yellow / Appearance: Clear / S.010 / pH: x  Gluc: x / Ketone: Negative  / Bili: Negative / Urobili: Negative   Blood: x / Protein: 100 / Nitrite: Negative   Leuk Esterase: Moderate / RBC: 11-25 /HPF / WBC 6-10   Sq Epi: x / Non Sq Epi: x / Bacteria: Occasional                RADIOLOGY & ADDITIONAL TESTS: Personally reviewed.     Consultant(s) Notes Reviewed:  [x] YES  [ ] NO   Discussed with MERRY/ISABEL, RN

## 2023-05-04 NOTE — CARE COORDINATION ASSESSMENT. - NSCAREPROVIDERS_GEN_ALL_CORE_FT
CARE PROVIDERS:  Accepting Physician: Jj Newsome  Administration: Raf Drake  Administration: Dennise Beard  Admitting: Jj Newsome  Attending: Jj Newsome  Case Management: Rickie George  Case Management: Marisa Stephens  Consultant: Janet Short Team: Vladimir Tovar  ED Attending: Francois Bermudez  ED Nurse: Hero Gasca  ED Nurse 2: Susie Marie  HIM/Billing & Coding: Vivien Atwood  Infection Control: Jane Trujillo  Nurse: Geri Dawn  Nurse: Nandini Wallace  Nurse: Tahira Santo  Nurse: Starr Arrington  Ordered: ADM, User  Ordered: Doctor, Unknown  PCA/Nursing Assistant: Carmen Yuan  PCA/Nursing Assistant: Regina Slater  Physical Therapy: María Wall  Primary Team: Carey Sawant  Primary Team: Jj Newsome  Primary Team: Maci Singletary  Registered Dietitian: Maribel Carrera  Research: Tammy Brower  : Mabel Hitchcock  : Conchis Holt  Speech Pathology: Anushka Estrada  Speech Pathology: Nelly Garcia  Speech Pathology: Jane Hernandez  Team: [facility], Team

## 2023-05-04 NOTE — DIETITIAN INITIAL EVALUATION ADULT - OTHER INFO
77-year-old white male with history of dementia hypertension atrial fibrillation s/p Watchman 2020 anemia BPH hyperlipidemia GERD gout schizophrenia anxiety disorder CKD as well as renal osteodystrophy admitted for "altered mental status" /agitation , CKD and anemia.    Pt awake/confused at time of visit; pts sister at bedside. S/p SLP evaluation (5/4) with recommendation for soft and bite sized diet with thin liquids. Pta from [facility] on renal, low na chopped diet. Pt sister reports signficant weight loss ~100lbs over past couple of years. Noted in H+P >70lb weigh tloss over past few months. Noted significant decline in appetite/po intake over past few months. Per sister, drinks nepro supplement at times. Admission weight 216lbs. Transfer papers 208lbs. Had been 300lb a few years ago. At time of visit pt had just consumed 1 yogurt & some blueberries that were provided by pts sister. Encourage soft and bite sized diet texture per SLP recommendation.  Per MOLST, DNR/DNI/No Tube feeding.

## 2023-05-04 NOTE — PHYSICAL THERAPY INITIAL EVALUATION ADULT - ADDITIONAL COMMENTS
per EMR, pt was from [facility]. Pt is a poor historian secondary to altered mental status

## 2023-05-04 NOTE — CARE COORDINATION ASSESSMENT. - FACILITY OF RESIDENCE YN
PDMP reviewed. No aberrant behavior.      PDMP reviewed. No aberrant behavior.  Latex:  Patient denies allergy to latex.  Medications reviewed with patient.  Tobacco use verified.  Allergies verified.      Primary Medical Doctor: Panfilo Matute MD   DATE OF INJURY/SURGERY:  none  Chief Complaint   Patient presents with   • Knee Pain     right knee first synvisc injection   WORK RELATED: no  OCCUPATION: retired    Patient is here for right knee follow up and first synvisc injection. More pain by the end of the day. Uses voltaren gel and advil for pain.    [facility]/Yes

## 2023-05-04 NOTE — PROGRESS NOTE ADULT - PROBLEM SELECTOR PLAN 5
ARNOLD on CKD unknown baseline   hx of renal osteodystrophy  Pt w/ poor PO intake PTA   IVF w/LR @ 50cc/hr X16h  Monitor BMP  Avoid nephrotoxic medications as able

## 2023-05-04 NOTE — DIETITIAN INITIAL EVALUATION ADULT - PROBLEM SELECTOR PLAN 1
hx of advanced dementia, schizophrenia, anxiety. pt tearful at bedside though calm AAOX1 to self  parainfluenza +, isolation precautions  Viral infection process likely also contributing to mental status  CT head non con: Anterior parasellar aneurysm coil. No acute hemorrhage, extra-axial collection or mass effect. Bilateral maxillary sinus air-fluid levels.   do not suspect bacterial infection  CXR: No acute finding or change.  UA: neg for uTI  f/up blood cx, ucx  ECG: AFib 78ms, Qtc 461ms on admission  continue home sertraline qd and alprazolam qd for anxiety  continue home risperidone for schizophrenia  family reports rapid and progressive overall deterioration w/significant weight loss - nutrition consult, f/u recs  fall and aspiration precautions  Psychiatry Dr. Shepard consulted, f/u recs

## 2023-05-04 NOTE — DIETITIAN INITIAL EVALUATION ADULT - NS FNS DIET ORDER
Diet, Soft and Bite Sized:   Consistent Carbohydrate {No Snacks}  DASH/TLC {Sodium & Cholesterol Restricted}  For patients receiving Renal Replacement - No Protein Restr, No Conc K, No Conc Phos, Low Sodium (RENAL) (05-04-23 @ 12:30)

## 2023-05-04 NOTE — SWALLOW BEDSIDE ASSESSMENT ADULT - ADDITIONAL RECOMMENDATIONS
1. Monitor for PO intake and tolerance.   2. Notify SLP if changes in cognition occur.  3. This service to follow up as schedule permits. Please reconsult as needed.

## 2023-05-04 NOTE — PROGRESS NOTE ADULT - PROBLEM SELECTOR PLAN 6
normocytic anemia  hgb 8.6 on admission likely 2/2 chronic disease vs hematuria on UA  baseline hgb unknown, slowly downtrending continue to monitor  f/up iron studies, b12, folate  check fobt  f/up repeat H/H. monitor for dilutional results given recent fluid administration  maintain active type and screen  monitor for s/s anemia and occult bleeding

## 2023-05-04 NOTE — DIETITIAN INITIAL EVALUATION ADULT - PROBLEM SELECTOR PLAN 4
hx of renal osteodystrophy  Cr 2.3 on admission, unknown baseline  Pt w/ poor PO intake PTA   IVF w/LR @ 50cc/hr X16h  Monitor BMP  Avoid nephrotoxic medications as able

## 2023-05-04 NOTE — SWALLOW BEDSIDE ASSESSMENT ADULT - ORAL PHASE
Within functional limits Reduced cognition hindered awareness to bolus, increased mastication time/Delayed oral transit time

## 2023-05-04 NOTE — PATIENT CHOICE NOTE. - NSPTCHOICESTATE_GEN_ALL_CORE

## 2023-05-04 NOTE — DIETITIAN INITIAL EVALUATION ADULT - NSICDXPASTSURGICALHX_GEN_ALL_CORE_FT
PAST SURGICAL HISTORY:  h/o Arthroscopy left knee 2004     h/o Arthroscopy right knee 2007     h/o Cerebral aneurysm coiling 2/18/2011 and 7/15/2011 [facility]    No significant past surgical history     total knee arthroplasty left 3/5/2012

## 2023-05-04 NOTE — SWALLOW BEDSIDE ASSESSMENT ADULT - SWALLOW EVAL: DIAGNOSIS
1. Pt p/w functional oral swallow w/ puree, minced & moist, soft & bite sized and thin liquids marked by marked by adequate acceptance/containment, timely bolus prep/mastication, appearance of timely posterior transfer with no oral clearance deficits noted. 2. Pt p/w with mild to moderate oral dysphagia when consuming easy to chew solids marked by reduced awareness to bolus, increased mastication time and delayed AP transit secondary to reduced cognition requiring maximum verbal and tactile cues.  3. Pharyngeal stage functional with all trialed consistencies (puree, minced & moist, soft & bite sized, easy to chew solids and thin liquids) marked by timely swallow trigger with adequate laryngeal elevation upon palpation. No clinical evidence of airway penetration or aspiration.

## 2023-05-04 NOTE — DIETITIAN INITIAL EVALUATION ADULT - PERTINENT LABORATORY DATA
05-04    144  |  117<H>  |  26<H>  ----------------------------<  97  3.7   |  23  |  1.90<H>    Ca    8.5      04 May 2023 05:52    TPro  6.2  /  Alb  3.0<L>  /  TBili  0.5  /  DBili  x   /  AST  16  /  ALT  24  /  AlkPhos  66  05-03

## 2023-05-04 NOTE — PROGRESS NOTE ADULT - TIME BILLING
Reviewing chart notes and data, reviewing telemetry monitor records, face to face time counseling the patient and family, coordinating care with SW/CM at [facility]

## 2023-05-04 NOTE — PROGRESS NOTE ADULT - PROBLEM SELECTOR PLAN 1
hx of advanced dementia, schizophrenia, anxiety. pt tearful at bedside though calm AAOX1 to self  parainfluenza +, isolation precautions  Viral infection process likely also contributing to mental status  CT head non con: Anterior parasellar aneurysm coil. No acute hemorrhage, extra-axial collection or mass effect. Bilateral maxillary sinus air-fluid levels.   do not suspect bacterial infection  CXR: No acute finding or change.  UA: neg for uTI  f/up blood cx, ucx  ECG: AFib 78ms, Qtc 461ms on admission  continue home sertraline qd and alprazolam qd for anxiety  continue home risperidone for schizophrenia  family reports rapid and progressive overall deterioration w/significant weight loss - nutrition consult, f/u recs  fall and aspiration precautions  Psychiatry Dr. Shepard consulted, f/u recs.

## 2023-05-04 NOTE — SWALLOW BEDSIDE ASSESSMENT ADULT - SWALLOW EVAL: RECOMMENDED FEEDING/EATING TECHNIQUES
check mouth frequently for oral residue/pocketing/maintain upright posture during/after eating for 30 mins/oral hygiene/position upright (90 degrees)/small sips/bites

## 2023-05-04 NOTE — DIETITIAN NUTRITION RISK NOTIFICATION - TREATMENT: THE FOLLOWING DIET HAS BEEN RECOMMENDED
Diet, Soft and Bite Sized:   Low Sodium  Supplement Feeding Modality:  Oral  Nepro Cans or Servings Per Day:  1       Frequency:  Two Times a day (05-04-23 @ 15:41) [Pending Verification By Attending]  Diet, Soft and Bite Sized:   Consistent Carbohydrate {No Snacks}  DASH/TLC {Sodium & Cholesterol Restricted}  For patients receiving Renal Replacement - No Protein Restr, No Conc K, No Conc Phos, Low Sodium (RENAL) (05-04-23 @ 12:30) [Active]

## 2023-05-04 NOTE — SWALLOW BEDSIDE ASSESSMENT ADULT - COMMENTS
Functional swallow with minced and moist solids. Functional swallow with soft and bite sized solids. Pt p/w with mild to moderate oral dysphagia when consuming easy to chew solids marked by reduced awareness to bolus, increased mastication time and delayed AP transit secondary to reduced cognition requiring maximum verbal and tactile cues. Functional swallow with thin liquids. No overt s/s of penetration/aspiration. Consult received, chart reviewed. Initial assessment of swallow function conducted. Pt received in awake in bed on RA. Pt p/w reduced cognition, consistent verbalizations and emotional lability. Pt making statements including "I want to die" and "mommy come save me." He was AxO-1, inconsistently followed simple one-step commands and required maximum verbal cues to redirect. Pt accepted PO trials with maximum encouragement from SLP. Pt left as received. Recommending soft and bite sized diet with thin liquids. RN (Tahira Mckay) notified in person.    Per charting, "77-year-old white male with history of dementia hypertension atrial fibrillation s/p [employer]man 2020 anemia BPH hyperlipidemia GERD gout schizophrenia anxiety disorder CKD as well as renal osteodystrophy sent to the emergency department here at [facility] today for evaluation of "altered mental status".  Per transfer papers from [facility] as well as from [facility] EMS who transported patient here patient has been more aggressive in terms of behavior and they felt he may be in danger to himself and potentially others.  Wife got called by the facility this AM and heard pt screaming "everyone is dead here" and threatening to call the police. Niece at bedside also reports accounts over the wknd of pt undressing himself including his diaper and smearing feces around the facility. Family reports ICU admission at [facility] march 2023 2/2 sepsis. They also recount rapid and progressive decline in health and hygiene w/>70lb weight loss in recent months and worsening dementia. No additional subjective history is available at this time secondary to patient's baseline dementia.  Pt is currently AAOX1 to self and tearful saying he is going to die and asking for his wife."    Per charting, "CT head non con: Anterior parasellar aneurysm coil. No acute hemorrhage, extra-axial collection or mass effect. Bilateral maxillary sinus air-fluid levels. CXR: No acute finding or change."

## 2023-05-04 NOTE — CARE COORDINATION ASSESSMENT. - NSPASTMEDSURGHISTORY_GEN_ALL_CORE_FT
PAST MEDICAL & SURGICAL HISTORY:  h/o HTN (hypertension)      Gout      HLD (hyperlipidemia)      Arthritis      Prostate enlargement      h/o Arthroscopy right knee 2007      h/o Arthroscopy left knee 2004      h/o Cerebral aneurysm coiling  2/18/2011 and 7/15/2011 [facility]      H/o Cerebral aneurysm      Obesity      total knee arthroplasty left 3/5/2012      Schizophrenia      Dementia      Atrial fibrillation      HTN (hypertension)      No significant past surgical history

## 2023-05-05 LAB
ANION GAP SERPL CALC-SCNC: 6 MMOL/L — SIGNIFICANT CHANGE UP (ref 5–17)
BUN SERPL-MCNC: 26 MG/DL — HIGH (ref 7–23)
CALCIUM SERPL-MCNC: 8.4 MG/DL — LOW (ref 8.5–10.1)
CHLORIDE SERPL-SCNC: 116 MMOL/L — HIGH (ref 96–108)
CO2 SERPL-SCNC: 20 MMOL/L — LOW (ref 22–31)
CREAT SERPL-MCNC: 1.8 MG/DL — HIGH (ref 0.5–1.3)
EGFR: 38 ML/MIN/1.73M2 — LOW
GLUCOSE SERPL-MCNC: 91 MG/DL — SIGNIFICANT CHANGE UP (ref 70–99)
HCT VFR BLD CALC: 24.6 % — LOW (ref 39–50)
HGB BLD-MCNC: 7.8 G/DL — LOW (ref 13–17)
MCHC RBC-ENTMCNC: 29.4 PG — SIGNIFICANT CHANGE UP (ref 27–34)
MCHC RBC-ENTMCNC: 31.7 GM/DL — LOW (ref 32–36)
MCV RBC AUTO: 92.8 FL — SIGNIFICANT CHANGE UP (ref 80–100)
NRBC # BLD: 0 /100 WBCS — SIGNIFICANT CHANGE UP (ref 0–0)
PLATELET # BLD AUTO: 169 K/UL — SIGNIFICANT CHANGE UP (ref 150–400)
POTASSIUM SERPL-MCNC: 3.8 MMOL/L — SIGNIFICANT CHANGE UP (ref 3.5–5.3)
POTASSIUM SERPL-SCNC: 3.8 MMOL/L — SIGNIFICANT CHANGE UP (ref 3.5–5.3)
RBC # BLD: 2.65 M/UL — LOW (ref 4.2–5.8)
RBC # FLD: 16.2 % — HIGH (ref 10.3–14.5)
SODIUM SERPL-SCNC: 142 MMOL/L — SIGNIFICANT CHANGE UP (ref 135–145)
WBC # BLD: 5.71 K/UL — SIGNIFICANT CHANGE UP (ref 3.8–10.5)
WBC # FLD AUTO: 5.71 K/UL — SIGNIFICANT CHANGE UP (ref 3.8–10.5)

## 2023-05-05 PROCEDURE — 99233 SBSQ HOSP IP/OBS HIGH 50: CPT

## 2023-05-05 RX ORDER — LANOLIN ALCOHOL/MO/W.PET/CERES
5 CREAM (GRAM) TOPICAL AT BEDTIME
Refills: 0 | Status: DISCONTINUED | OUTPATIENT
Start: 2023-05-05 | End: 2023-05-15

## 2023-05-05 RX ORDER — SODIUM CHLORIDE 9 MG/ML
1000 INJECTION, SOLUTION INTRAVENOUS
Refills: 0 | Status: DISCONTINUED | OUTPATIENT
Start: 2023-05-05 | End: 2023-05-06

## 2023-05-05 RX ADMIN — RISPERIDONE 0.25 MILLIGRAM(S): 4 TABLET ORAL at 18:03

## 2023-05-05 RX ADMIN — CLOTRIMAZOLE AND BETAMETHASONE DIPROPIONATE 1 APPLICATION(S): 10; .5 CREAM TOPICAL at 05:51

## 2023-05-05 RX ADMIN — TAMSULOSIN HYDROCHLORIDE 0.4 MILLIGRAM(S): 0.4 CAPSULE ORAL at 00:09

## 2023-05-05 RX ADMIN — Medication 5 MILLIGRAM(S): at 22:25

## 2023-05-05 RX ADMIN — Medication 500 MILLIGRAM(S): at 05:51

## 2023-05-05 RX ADMIN — Medication 0.6 MILLIGRAM(S): at 13:19

## 2023-05-05 RX ADMIN — SERTRALINE 50 MILLIGRAM(S): 25 TABLET, FILM COATED ORAL at 13:19

## 2023-05-05 RX ADMIN — Medication 100 MILLIGRAM(S): at 05:50

## 2023-05-05 RX ADMIN — SENNA PLUS 1 TABLET(S): 8.6 TABLET ORAL at 00:09

## 2023-05-05 RX ADMIN — NYSTATIN CREAM 1 APPLICATION(S): 100000 CREAM TOPICAL at 18:43

## 2023-05-05 RX ADMIN — TAMSULOSIN HYDROCHLORIDE 0.4 MILLIGRAM(S): 0.4 CAPSULE ORAL at 22:25

## 2023-05-05 RX ADMIN — Medication 1 TABLET(S): at 13:20

## 2023-05-05 RX ADMIN — NYSTATIN CREAM 1 APPLICATION(S): 100000 CREAM TOPICAL at 05:51

## 2023-05-05 RX ADMIN — HEPARIN SODIUM 5000 UNIT(S): 5000 INJECTION INTRAVENOUS; SUBCUTANEOUS at 00:09

## 2023-05-05 RX ADMIN — HEPARIN SODIUM 5000 UNIT(S): 5000 INJECTION INTRAVENOUS; SUBCUTANEOUS at 13:21

## 2023-05-05 RX ADMIN — Medication 500 MILLIGRAM(S): at 18:03

## 2023-05-05 RX ADMIN — Medication 10 MILLIGRAM(S): at 00:09

## 2023-05-05 RX ADMIN — SENNA PLUS 1 TABLET(S): 8.6 TABLET ORAL at 22:25

## 2023-05-05 RX ADMIN — ATORVASTATIN CALCIUM 10 MILLIGRAM(S): 80 TABLET, FILM COATED ORAL at 00:09

## 2023-05-05 RX ADMIN — MEMANTINE HYDROCHLORIDE 10 MILLIGRAM(S): 10 TABLET ORAL at 13:21

## 2023-05-05 RX ADMIN — RISPERIDONE 0.25 MILLIGRAM(S): 4 TABLET ORAL at 05:51

## 2023-05-05 RX ADMIN — FAMOTIDINE 20 MILLIGRAM(S): 10 INJECTION INTRAVENOUS at 13:21

## 2023-05-05 RX ADMIN — Medication 0.25 MILLIGRAM(S): at 22:26

## 2023-05-05 RX ADMIN — Medication 300 MILLIGRAM(S): at 13:20

## 2023-05-05 RX ADMIN — CLOTRIMAZOLE AND BETAMETHASONE DIPROPIONATE 1 APPLICATION(S): 10; .5 CREAM TOPICAL at 18:03

## 2023-05-05 RX ADMIN — HEPARIN SODIUM 5000 UNIT(S): 5000 INJECTION INTRAVENOUS; SUBCUTANEOUS at 05:50

## 2023-05-05 RX ADMIN — ATORVASTATIN CALCIUM 10 MILLIGRAM(S): 80 TABLET, FILM COATED ORAL at 22:25

## 2023-05-05 NOTE — PROGRESS NOTE ADULT - SUBJECTIVE AND OBJECTIVE BOX
Patient is a 77y old  Male who presents with a chief complaint of Altered mental status     (04 May 2023 15:17)    INTERVAL HPI/OVERNIGHT EVENTS: Patient was seen and examined. Was tearful earlier today but calmer when his sister was at bedside.     I&O's Summary    04 May 2023 07:01  -  05 May 2023 07:00  --------------------------------------------------------  IN: 600 mL / OUT: 0 mL / NET: 600 mL        LABS:                        7.8    5.71  )-----------( 169      ( 05 May 2023 04:56 )             24.6     05-05    142  |  116<H>  |  26<H>  ----------------------------<  91  3.8   |  20<L>  |  1.80<H>    Ca    8.4<L>      05 May 2023 04:56          CAPILLARY BLOOD GLUCOSE      MEDICATIONS  (STANDING):  allopurinol 300 milliGRAM(s) Oral daily  ALPRAZolam 0.25 milliGRAM(s) Oral at bedtime  ascorbic acid 500 milliGRAM(s) Oral two times a day  atorvastatin 10 milliGRAM(s) Oral at bedtime  clotrimazole/betamethasone Cream 1 Application(s) Topical two times a day  colchicine 0.6 milliGRAM(s) Oral daily  famotidine    Tablet 20 milliGRAM(s) Oral daily  heparin   Injectable 5000 Unit(s) SubCutaneous every 8 hours  lactated ringers. 1000 milliLiter(s) (75 mL/Hr) IV Continuous <Continuous>  memantine 10 milliGRAM(s) Oral daily  metoprolol succinate  milliGRAM(s) Oral daily  multivitamin 1 Tablet(s) Oral daily  nystatin Cream 1 Application(s) Topical two times a day  risperiDONE   Tablet 0.25 milliGRAM(s) Oral two times a day  senna 1 Tablet(s) Oral at bedtime  sertraline 50 milliGRAM(s) Oral daily  tamsulosin 0.4 milliGRAM(s) Oral at bedtime    MEDICATIONS  (PRN):  acetaminophen     Tablet .. 650 milliGRAM(s) Oral every 6 hours PRN Mild Pain (1 - 3)  bisacodyl Suppository 10 milliGRAM(s) Rectal daily PRN Constipation  magnesium hydroxide Suspension 30 milliLiter(s) Oral daily PRN Constipation      REVIEW OF SYSTEMS:  CONSTITUTIONAL: No fever or chills  HEENT:  No headache, no sore throat  RESPIRATORY: No cough, wheezing, or shortness of breath  CARDIOVASCULAR: No chest pain, palpitations  GASTROINTESTINAL: No abdominal pain, nausea, vomiting, or diarrhea  GENITOURINARY: No dysuria, frequency, or hematuria  NEUROLOGICAL: no focal weakness or dizziness  MUSCULOSKELETAL: no myalgias  PSYCH: no recent changes in mood    RADIOLOGY & ADDITIONAL TESTS:    Imaging Personally Reviewed:  [x] YES  [ ] NO    Consultant(s) Notes Reviewed:  [x] YES  [ ] NO    PHYSICAL EXAM:  T(C): 36.8 (05-05-23 @ 13:28), Max: 36.9 (05-04-23 @ 20:42)  HR: 61 (05-05-23 @ 13:28) (61 - 83)  BP: 109/62 (05-05-23 @ 13:28) (109/62 - 121/83)  RR: 18 (05-05-23 @ 13:28) (18 - 18)  SpO2: 97% (05-05-23 @ 13:28) (97% - 98%)    GENERAL: NAD, well-developed, well-groomed  HEENT:  anicteric, moist mucous membranes  CHEST/LUNG:  CTA b/l, no rales, wheezes, or rhonchi  HEART:  RRR, S1, S2  ABDOMEN:  BS+, soft, nontender, nondistended  EXTREMITIES: no edema, cyanosis, or calf tenderness  NERVOUS SYSTEM: answers questions and follows commands appropriately  PSYCH: normal affect    Care Discussed with Consultants/Other Providers [x] YES  [ ] NO

## 2023-05-05 NOTE — PROGRESS NOTE ADULT - ASSESSMENT
78 y/o PMH dementia, HTN, Afib s/p wachsman 2020, GERD, CKD, anxiety, schizophrenia who was admitted for AMS/agitation. Patient calm and cooperative at bedside. sister reports almost back to baseline     A/P:  metabolic encephalopathy   dementia  +parainfluenza virus     - improving.      - UA negative     - cont current meds      afib     - cont metoprolol      acute on chronic renal disease    - cont IVF     - h/o renal osteodystrophy     - cont BMP. avoid nephrotoxic medications     anemia    - hgb stable. continue to monitor     HLD    - cont pravastatin     DVT proph: heparin 5000 units TID   discharge planning to [facility] in 1-2 days.   discussed with CM/SW and sister at bedside.

## 2023-05-06 LAB
-  AMIKACIN: SIGNIFICANT CHANGE UP
-  AMOXICILLIN/CLAVULANIC ACID: SIGNIFICANT CHANGE UP
-  AMPICILLIN/SULBACTAM: SIGNIFICANT CHANGE UP
-  AMPICILLIN: SIGNIFICANT CHANGE UP
-  AZTREONAM: SIGNIFICANT CHANGE UP
-  CEFAZOLIN: SIGNIFICANT CHANGE UP
-  CEFEPIME: SIGNIFICANT CHANGE UP
-  CEFTRIAXONE: SIGNIFICANT CHANGE UP
-  CEFUROXIME: SIGNIFICANT CHANGE UP
-  CIPROFLOXACIN: SIGNIFICANT CHANGE UP
-  ERTAPENEM: SIGNIFICANT CHANGE UP
-  GENTAMICIN: SIGNIFICANT CHANGE UP
-  LEVOFLOXACIN: SIGNIFICANT CHANGE UP
-  MEROPENEM: SIGNIFICANT CHANGE UP
-  NITROFURANTOIN: SIGNIFICANT CHANGE UP
-  PIPERACILLIN/TAZOBACTAM: SIGNIFICANT CHANGE UP
-  TOBRAMYCIN: SIGNIFICANT CHANGE UP
-  TRIMETHOPRIM/SULFAMETHOXAZOLE: SIGNIFICANT CHANGE UP
ALBUMIN SERPL ELPH-MCNC: 2.4 G/DL — LOW (ref 3.3–5)
ALP SERPL-CCNC: 58 U/L — SIGNIFICANT CHANGE UP (ref 40–120)
ALT FLD-CCNC: 22 U/L — SIGNIFICANT CHANGE UP (ref 12–78)
ANION GAP SERPL CALC-SCNC: 4 MMOL/L — LOW (ref 5–17)
AST SERPL-CCNC: 18 U/L — SIGNIFICANT CHANGE UP (ref 15–37)
BASOPHILS # BLD AUTO: 0.03 K/UL — SIGNIFICANT CHANGE UP (ref 0–0.2)
BASOPHILS NFR BLD AUTO: 0.6 % — SIGNIFICANT CHANGE UP (ref 0–2)
BILIRUB SERPL-MCNC: 0.3 MG/DL — SIGNIFICANT CHANGE UP (ref 0.2–1.2)
BUN SERPL-MCNC: 28 MG/DL — HIGH (ref 7–23)
CALCIUM SERPL-MCNC: 8.2 MG/DL — LOW (ref 8.5–10.1)
CHLORIDE SERPL-SCNC: 115 MMOL/L — HIGH (ref 96–108)
CO2 SERPL-SCNC: 22 MMOL/L — SIGNIFICANT CHANGE UP (ref 22–31)
CREAT SERPL-MCNC: 1.8 MG/DL — HIGH (ref 0.5–1.3)
CULTURE RESULTS: SIGNIFICANT CHANGE UP
EGFR: 38 ML/MIN/1.73M2 — LOW
EOSINOPHIL # BLD AUTO: 0.27 K/UL — SIGNIFICANT CHANGE UP (ref 0–0.5)
EOSINOPHIL NFR BLD AUTO: 5.3 % — SIGNIFICANT CHANGE UP (ref 0–6)
GLUCOSE SERPL-MCNC: 118 MG/DL — HIGH (ref 70–99)
HCT VFR BLD CALC: 25.2 % — LOW (ref 39–50)
HGB BLD-MCNC: 7.9 G/DL — LOW (ref 13–17)
IMM GRANULOCYTES NFR BLD AUTO: 0.6 % — SIGNIFICANT CHANGE UP (ref 0–0.9)
LYMPHOCYTES # BLD AUTO: 1.44 K/UL — SIGNIFICANT CHANGE UP (ref 1–3.3)
LYMPHOCYTES # BLD AUTO: 28.2 % — SIGNIFICANT CHANGE UP (ref 13–44)
MCHC RBC-ENTMCNC: 29.8 PG — SIGNIFICANT CHANGE UP (ref 27–34)
MCHC RBC-ENTMCNC: 31.3 GM/DL — LOW (ref 32–36)
MCV RBC AUTO: 95.1 FL — SIGNIFICANT CHANGE UP (ref 80–100)
METHOD TYPE: SIGNIFICANT CHANGE UP
MONOCYTES # BLD AUTO: 0.39 K/UL — SIGNIFICANT CHANGE UP (ref 0–0.9)
MONOCYTES NFR BLD AUTO: 7.6 % — SIGNIFICANT CHANGE UP (ref 2–14)
NEUTROPHILS # BLD AUTO: 2.94 K/UL — SIGNIFICANT CHANGE UP (ref 1.8–7.4)
NEUTROPHILS NFR BLD AUTO: 57.7 % — SIGNIFICANT CHANGE UP (ref 43–77)
NRBC # BLD: 0 /100 WBCS — SIGNIFICANT CHANGE UP (ref 0–0)
ORGANISM # SPEC MICROSCOPIC CNT: SIGNIFICANT CHANGE UP
ORGANISM # SPEC MICROSCOPIC CNT: SIGNIFICANT CHANGE UP
PLATELET # BLD AUTO: 183 K/UL — SIGNIFICANT CHANGE UP (ref 150–400)
POTASSIUM SERPL-MCNC: 3.7 MMOL/L — SIGNIFICANT CHANGE UP (ref 3.5–5.3)
POTASSIUM SERPL-SCNC: 3.7 MMOL/L — SIGNIFICANT CHANGE UP (ref 3.5–5.3)
PROT SERPL-MCNC: 4.9 G/DL — LOW (ref 6–8.3)
RBC # BLD: 2.65 M/UL — LOW (ref 4.2–5.8)
RBC # FLD: 16.3 % — HIGH (ref 10.3–14.5)
SODIUM SERPL-SCNC: 141 MMOL/L — SIGNIFICANT CHANGE UP (ref 135–145)
SPECIMEN SOURCE: SIGNIFICANT CHANGE UP
WBC # BLD: 5.1 K/UL — SIGNIFICANT CHANGE UP (ref 3.8–10.5)
WBC # FLD AUTO: 5.1 K/UL — SIGNIFICANT CHANGE UP (ref 3.8–10.5)

## 2023-05-06 PROCEDURE — 99233 SBSQ HOSP IP/OBS HIGH 50: CPT

## 2023-05-06 RX ORDER — ALPRAZOLAM 0.25 MG
0.25 TABLET ORAL ONCE
Refills: 0 | Status: DISCONTINUED | OUTPATIENT
Start: 2023-05-06 | End: 2023-05-06

## 2023-05-06 RX ORDER — OLANZAPINE 15 MG/1
2.5 TABLET, FILM COATED ORAL EVERY 8 HOURS
Refills: 0 | Status: DISCONTINUED | OUTPATIENT
Start: 2023-05-06 | End: 2023-05-08

## 2023-05-06 RX ORDER — NYSTATIN CREAM 100000 [USP'U]/G
1 CREAM TOPICAL
Refills: 0 | Status: DISCONTINUED | OUTPATIENT
Start: 2023-05-06 | End: 2023-05-15

## 2023-05-06 RX ORDER — RISPERIDONE 4 MG/1
0.5 TABLET ORAL THREE TIMES A DAY
Refills: 0 | Status: DISCONTINUED | OUTPATIENT
Start: 2023-05-06 | End: 2023-05-08

## 2023-05-06 RX ADMIN — RISPERIDONE 0.25 MILLIGRAM(S): 4 TABLET ORAL at 06:02

## 2023-05-06 RX ADMIN — Medication 0.25 MILLIGRAM(S): at 22:02

## 2023-05-06 RX ADMIN — TAMSULOSIN HYDROCHLORIDE 0.4 MILLIGRAM(S): 0.4 CAPSULE ORAL at 22:02

## 2023-05-06 RX ADMIN — Medication 0.25 MILLIGRAM(S): at 06:02

## 2023-05-06 RX ADMIN — Medication 500 MILLIGRAM(S): at 17:34

## 2023-05-06 RX ADMIN — Medication 1 TABLET(S): at 12:40

## 2023-05-06 RX ADMIN — Medication 300 MILLIGRAM(S): at 12:39

## 2023-05-06 RX ADMIN — Medication 0.6 MILLIGRAM(S): at 12:38

## 2023-05-06 RX ADMIN — FAMOTIDINE 20 MILLIGRAM(S): 10 INJECTION INTRAVENOUS at 12:39

## 2023-05-06 RX ADMIN — RISPERIDONE 0.5 MILLIGRAM(S): 4 TABLET ORAL at 19:56

## 2023-05-06 RX ADMIN — NYSTATIN CREAM 1 APPLICATION(S): 100000 CREAM TOPICAL at 06:02

## 2023-05-06 RX ADMIN — SERTRALINE 50 MILLIGRAM(S): 25 TABLET, FILM COATED ORAL at 12:39

## 2023-05-06 RX ADMIN — RISPERIDONE 0.5 MILLIGRAM(S): 4 TABLET ORAL at 14:31

## 2023-05-06 RX ADMIN — Medication 100 MILLIGRAM(S): at 06:02

## 2023-05-06 RX ADMIN — Medication 500 MILLIGRAM(S): at 06:04

## 2023-05-06 RX ADMIN — MEMANTINE HYDROCHLORIDE 10 MILLIGRAM(S): 10 TABLET ORAL at 12:39

## 2023-05-06 RX ADMIN — ATORVASTATIN CALCIUM 10 MILLIGRAM(S): 80 TABLET, FILM COATED ORAL at 22:02

## 2023-05-06 RX ADMIN — CLOTRIMAZOLE AND BETAMETHASONE DIPROPIONATE 1 APPLICATION(S): 10; .5 CREAM TOPICAL at 06:02

## 2023-05-06 RX ADMIN — NYSTATIN CREAM 1 APPLICATION(S): 100000 CREAM TOPICAL at 17:19

## 2023-05-06 NOTE — PROGRESS NOTE ADULT - ASSESSMENT
78 y/o PMH dementia, HTN, Afib s/p wachsman 2020, GERD, CKD, anxiety, schizophrenia who was admitted for AMS/agitation. Patient calm and cooperative at bedside. sister reports almost back to baseline     A/P:  metabolic encephalopathy   dementia  +parainfluenza virus     - improving.      - UA negative     - cont current meds    - ativan prn    - psych consulted.       afib     - cont metoprolol      acute on chronic renal disease    - cont IVF     - h/o renal osteodystrophy     - cont BMP. avoid nephrotoxic medications     anemia    - hgb stable. continue to monitor     HLD    - cont pravastatin     DVT proph: heparin 5000 units TID   discussed with CM/SW and sister at bedside.    76 y/o PMH dementia, HTN, Afib s/p wachsman 2020, GERD, CKD, anxiety, schizophrenia who was admitted for AMS/agitation. Patient calm and cooperative at bedside. sister reports almost back to baseline     A/P:  metabolic encephalopathy   dementia  +parainfluenza virus     - improving.      - UA negative     - cont current meds    - psych consulted. d/w psych. will increase Risperdal to 0.5mg TID and zyprexa prn agitation       afib     - cont metoprolol      acute on chronic renal disease    - cont IVF     - h/o renal osteodystrophy     - cont BMP. avoid nephrotoxic medications     anemia    - hgb stable. continue to monitor     HLD    - cont pravastatin     DVT proph: heparin 5000 units TID   discussed with CM/SW and sister at bedside.

## 2023-05-06 NOTE — PROGRESS NOTE ADULT - SUBJECTIVE AND OBJECTIVE BOX
Patient is a 77y old  Male who presents with a chief complaint of agitation, CKD, anemia (05 May 2023 15:37)    INTERVAL HPI/OVERNIGHT EVENTS: Patient was seen and examined. had periods of sundowning overnight.   intermittently tearful. in better mood this AM.     I&O's Summary    05 May 2023 07:01  -  06 May 2023 07:00  --------------------------------------------------------  IN: 300 mL / OUT: 0 mL / NET: 300 mL        LABS:                        7.9    5.10  )-----------( 183      ( 06 May 2023 05:10 )             25.2     05-06    141  |  115<H>  |  28<H>  ----------------------------<  118<H>  3.7   |  22  |  1.80<H>    Ca    8.2<L>      06 May 2023 05:10    TPro  4.9<L>  /  Alb  2.4<L>  /  TBili  0.3  /  DBili  x   /  AST  18  /  ALT  22  /  AlkPhos  58  05-06        CAPILLARY BLOOD GLUCOSE      MEDICATIONS  (STANDING):  allopurinol 300 milliGRAM(s) Oral daily  ALPRAZolam 0.25 milliGRAM(s) Oral at bedtime  ascorbic acid 500 milliGRAM(s) Oral two times a day  atorvastatin 10 milliGRAM(s) Oral at bedtime  clotrimazole/betamethasone Cream 1 Application(s) Topical two times a day  colchicine 0.6 milliGRAM(s) Oral daily  famotidine    Tablet 20 milliGRAM(s) Oral daily  lactated ringers. 1000 milliLiter(s) (75 mL/Hr) IV Continuous <Continuous>  melatonin 5 milliGRAM(s) Oral at bedtime  memantine 10 milliGRAM(s) Oral daily  metoprolol succinate  milliGRAM(s) Oral daily  multivitamin 1 Tablet(s) Oral daily  nystatin Cream 1 Application(s) Topical two times a day  risperiDONE   Tablet 0.25 milliGRAM(s) Oral two times a day  senna 1 Tablet(s) Oral at bedtime  sertraline 50 milliGRAM(s) Oral daily  tamsulosin 0.4 milliGRAM(s) Oral at bedtime    MEDICATIONS  (PRN):  acetaminophen     Tablet .. 650 milliGRAM(s) Oral every 6 hours PRN Mild Pain (1 - 3)  bisacodyl Suppository 10 milliGRAM(s) Rectal daily PRN Constipation  magnesium hydroxide Suspension 30 milliLiter(s) Oral daily PRN Constipation      REVIEW OF SYSTEMS:  CONSTITUTIONAL: No fever or chills  HEENT:  No headache, no sore throat  RESPIRATORY: No cough, wheezing, or shortness of breath  CARDIOVASCULAR: No chest pain, palpitations  GASTROINTESTINAL: No abdominal pain, nausea, vomiting, or diarrhea  GENITOURINARY: No dysuria, frequency, or hematuria  NEUROLOGICAL: no focal weakness or dizziness  MUSCULOSKELETAL: no myalgias  PSYCH: no recent changes in mood    RADIOLOGY & ADDITIONAL TESTS:    Imaging Personally Reviewed:  [x] YES  [ ] NO    Consultant(s) Notes Reviewed:  [x] YES  [ ] NO    PHYSICAL EXAM:  T(C): 37.1 (05-06-23 @ 05:58), Max: 37.1 (05-06-23 @ 05:58)  HR: 66 (05-06-23 @ 05:58) (61 - 67)  BP: 118/72 (05-06-23 @ 05:58) (109/62 - 118/72)  RR: 18 (05-06-23 @ 05:58) (18 - 18)  SpO2: 98% (05-06-23 @ 05:58) (97% - 98%)    GENERAL: NAD, well-developed, well-groomed  HEENT:  anicteric, moist mucous membranes  CHEST/LUNG:  CTA b/l, no rales, wheezes, or rhonchi  HEART:  RRR, S1, S2  ABDOMEN:  BS+, soft, nontender, nondistended  EXTREMITIES: no edema, cyanosis, or calf tenderness  NERVOUS SYSTEM: confused  PSYCH: normal affect    Care Discussed with Consultants/Other Providers [x] YES  [ ] NO

## 2023-05-07 LAB
ALBUMIN SERPL ELPH-MCNC: 2.7 G/DL — LOW (ref 3.3–5)
ALP SERPL-CCNC: 63 U/L — SIGNIFICANT CHANGE UP (ref 40–120)
ALT FLD-CCNC: 25 U/L — SIGNIFICANT CHANGE UP (ref 12–78)
ANION GAP SERPL CALC-SCNC: 4 MMOL/L — LOW (ref 5–17)
AST SERPL-CCNC: 20 U/L — SIGNIFICANT CHANGE UP (ref 15–37)
BASOPHILS # BLD AUTO: 0.04 K/UL — SIGNIFICANT CHANGE UP (ref 0–0.2)
BASOPHILS NFR BLD AUTO: 0.7 % — SIGNIFICANT CHANGE UP (ref 0–2)
BILIRUB SERPL-MCNC: 0.5 MG/DL — SIGNIFICANT CHANGE UP (ref 0.2–1.2)
BUN SERPL-MCNC: 24 MG/DL — HIGH (ref 7–23)
CALCIUM SERPL-MCNC: 8.6 MG/DL — SIGNIFICANT CHANGE UP (ref 8.5–10.1)
CHLORIDE SERPL-SCNC: 115 MMOL/L — HIGH (ref 96–108)
CO2 SERPL-SCNC: 22 MMOL/L — SIGNIFICANT CHANGE UP (ref 22–31)
CREAT SERPL-MCNC: 1.8 MG/DL — HIGH (ref 0.5–1.3)
EGFR: 38 ML/MIN/1.73M2 — LOW
EOSINOPHIL # BLD AUTO: 0.28 K/UL — SIGNIFICANT CHANGE UP (ref 0–0.5)
EOSINOPHIL NFR BLD AUTO: 5.1 % — SIGNIFICANT CHANGE UP (ref 0–6)
GLUCOSE SERPL-MCNC: 109 MG/DL — HIGH (ref 70–99)
HCT VFR BLD CALC: 27.7 % — LOW (ref 39–50)
HGB BLD-MCNC: 8.5 G/DL — LOW (ref 13–17)
IMM GRANULOCYTES NFR BLD AUTO: 0.7 % — SIGNIFICANT CHANGE UP (ref 0–0.9)
LYMPHOCYTES # BLD AUTO: 1.21 K/UL — SIGNIFICANT CHANGE UP (ref 1–3.3)
LYMPHOCYTES # BLD AUTO: 22.1 % — SIGNIFICANT CHANGE UP (ref 13–44)
MCHC RBC-ENTMCNC: 28.8 PG — SIGNIFICANT CHANGE UP (ref 27–34)
MCHC RBC-ENTMCNC: 30.7 GM/DL — LOW (ref 32–36)
MCV RBC AUTO: 93.9 FL — SIGNIFICANT CHANGE UP (ref 80–100)
MONOCYTES # BLD AUTO: 0.34 K/UL — SIGNIFICANT CHANGE UP (ref 0–0.9)
MONOCYTES NFR BLD AUTO: 6.2 % — SIGNIFICANT CHANGE UP (ref 2–14)
NEUTROPHILS # BLD AUTO: 3.56 K/UL — SIGNIFICANT CHANGE UP (ref 1.8–7.4)
NEUTROPHILS NFR BLD AUTO: 65.2 % — SIGNIFICANT CHANGE UP (ref 43–77)
NRBC # BLD: 0 /100 WBCS — SIGNIFICANT CHANGE UP (ref 0–0)
PLATELET # BLD AUTO: 185 K/UL — SIGNIFICANT CHANGE UP (ref 150–400)
POTASSIUM SERPL-MCNC: 3.7 MMOL/L — SIGNIFICANT CHANGE UP (ref 3.5–5.3)
POTASSIUM SERPL-SCNC: 3.7 MMOL/L — SIGNIFICANT CHANGE UP (ref 3.5–5.3)
PROT SERPL-MCNC: 5.4 G/DL — LOW (ref 6–8.3)
RBC # BLD: 2.95 M/UL — LOW (ref 4.2–5.8)
RBC # FLD: 16.6 % — HIGH (ref 10.3–14.5)
SODIUM SERPL-SCNC: 141 MMOL/L — SIGNIFICANT CHANGE UP (ref 135–145)
WBC # BLD: 5.47 K/UL — SIGNIFICANT CHANGE UP (ref 3.8–10.5)
WBC # FLD AUTO: 5.47 K/UL — SIGNIFICANT CHANGE UP (ref 3.8–10.5)

## 2023-05-07 PROCEDURE — 99233 SBSQ HOSP IP/OBS HIGH 50: CPT

## 2023-05-07 RX ORDER — HALOPERIDOL DECANOATE 100 MG/ML
2 INJECTION INTRAMUSCULAR EVERY 8 HOURS
Refills: 0 | Status: DISCONTINUED | OUTPATIENT
Start: 2023-05-07 | End: 2023-05-08

## 2023-05-07 RX ADMIN — Medication 100 MILLIGRAM(S): at 06:20

## 2023-05-07 RX ADMIN — TAMSULOSIN HYDROCHLORIDE 0.4 MILLIGRAM(S): 0.4 CAPSULE ORAL at 22:24

## 2023-05-07 RX ADMIN — Medication 650 MILLIGRAM(S): at 15:49

## 2023-05-07 RX ADMIN — Medication 650 MILLIGRAM(S): at 16:49

## 2023-05-07 RX ADMIN — ATORVASTATIN CALCIUM 10 MILLIGRAM(S): 80 TABLET, FILM COATED ORAL at 22:24

## 2023-05-07 RX ADMIN — NYSTATIN CREAM 1 APPLICATION(S): 100000 CREAM TOPICAL at 06:25

## 2023-05-07 RX ADMIN — OLANZAPINE 2.5 MILLIGRAM(S): 15 TABLET, FILM COATED ORAL at 20:13

## 2023-05-07 RX ADMIN — Medication 300 MILLIGRAM(S): at 12:32

## 2023-05-07 RX ADMIN — RISPERIDONE 0.5 MILLIGRAM(S): 4 TABLET ORAL at 06:20

## 2023-05-07 RX ADMIN — HALOPERIDOL DECANOATE 2 MILLIGRAM(S): 100 INJECTION INTRAMUSCULAR at 23:56

## 2023-05-07 RX ADMIN — RISPERIDONE 0.5 MILLIGRAM(S): 4 TABLET ORAL at 22:24

## 2023-05-07 RX ADMIN — SENNA PLUS 1 TABLET(S): 8.6 TABLET ORAL at 06:17

## 2023-05-07 RX ADMIN — SENNA PLUS 1 TABLET(S): 8.6 TABLET ORAL at 22:24

## 2023-05-07 RX ADMIN — Medication 5 MILLIGRAM(S): at 06:17

## 2023-05-07 RX ADMIN — Medication 5 MILLIGRAM(S): at 22:24

## 2023-05-07 RX ADMIN — SERTRALINE 50 MILLIGRAM(S): 25 TABLET, FILM COATED ORAL at 12:32

## 2023-05-07 RX ADMIN — NYSTATIN CREAM 1 APPLICATION(S): 100000 CREAM TOPICAL at 18:07

## 2023-05-07 RX ADMIN — Medication 0.6 MILLIGRAM(S): at 12:31

## 2023-05-07 RX ADMIN — Medication 500 MILLIGRAM(S): at 06:24

## 2023-05-07 RX ADMIN — HALOPERIDOL DECANOATE 2 MILLIGRAM(S): 100 INJECTION INTRAMUSCULAR at 16:35

## 2023-05-07 RX ADMIN — FAMOTIDINE 20 MILLIGRAM(S): 10 INJECTION INTRAVENOUS at 12:31

## 2023-05-07 RX ADMIN — MEMANTINE HYDROCHLORIDE 10 MILLIGRAM(S): 10 TABLET ORAL at 12:33

## 2023-05-07 RX ADMIN — RISPERIDONE 0.5 MILLIGRAM(S): 4 TABLET ORAL at 13:20

## 2023-05-07 RX ADMIN — Medication 1 TABLET(S): at 12:32

## 2023-05-07 RX ADMIN — Medication 500 MILLIGRAM(S): at 18:06

## 2023-05-07 NOTE — PROGRESS NOTE ADULT - ASSESSMENT
76 y/o PMH dementia, HTN, Afib s/p wachsman 2020, GERD, CKD, anxiety, schizophrenia who was admitted for AMS/agitation. Patient calm and cooperative at bedside. sister reports almost back to baseline     A/P:  metabolic encephalopathy   dementia  +parainfluenza virus     - improving.      - UCx showing ESBL proteus. ID consulted Dr Miguel     - cont current meds    - psych consulted. d/w psych. will increase Risperdal to 0.5mg TID and zyprexa prn agitation       afib     - cont metoprolol      acute on chronic renal disease    - cont IVF     - h/o renal osteodystrophy     - cont BMP. avoid nephrotoxic medications     anemia    - hgb stable. continue to monitor     HLD    - cont pravastatin     DVT proph: heparin 5000 units TID

## 2023-05-07 NOTE — PROVIDER CONTACT NOTE (CRITICAL VALUE NOTIFICATION) - TEST AND RESULT REPORTED:
UC + from 5/3/23 final result 10,000-49,000 Proteus Mirabilis ESBL   10,000-49,000 coag negative staphylococcus

## 2023-05-07 NOTE — PROGRESS NOTE ADULT - SUBJECTIVE AND OBJECTIVE BOX
Patient is a 77y old  Male who presents with a chief complaint of agitation, CKD, anemia (06 May 2023 11:43)    INTERVAL HPI/OVERNIGHT EVENTS: Patient was seen and examined. confused but calm. afebrile.     I&O's Summary    06 May 2023 07:01  -  07 May 2023 07:00  --------------------------------------------------------  IN: 0 mL / OUT: 750 mL / NET: -750 mL    07 May 2023 07:01  -  07 May 2023 12:24  --------------------------------------------------------  IN: 0 mL / OUT: 100 mL / NET: -100 mL        LABS:                        8.5    5.47  )-----------( 185      ( 07 May 2023 08:54 )             27.7     05-07    141  |  115<H>  |  24<H>  ----------------------------<  109<H>  3.7   |  22  |  1.80<H>    Ca    8.6      07 May 2023 08:54    TPro  5.4<L>  /  Alb  2.7<L>  /  TBili  0.5  /  DBili  x   /  AST  20  /  ALT  25  /  AlkPhos  63  05-07        CAPILLARY BLOOD GLUCOSE      MEDICATIONS  (STANDING):  allopurinol 300 milliGRAM(s) Oral daily  ALPRAZolam 0.25 milliGRAM(s) Oral at bedtime  ascorbic acid 500 milliGRAM(s) Oral two times a day  atorvastatin 10 milliGRAM(s) Oral at bedtime  colchicine 0.6 milliGRAM(s) Oral daily  famotidine    Tablet 20 milliGRAM(s) Oral daily  melatonin 5 milliGRAM(s) Oral at bedtime  memantine 10 milliGRAM(s) Oral daily  metoprolol succinate  milliGRAM(s) Oral daily  multivitamin 1 Tablet(s) Oral daily  nystatin Powder 1 Application(s) Topical two times a day  risperiDONE   Tablet 0.5 milliGRAM(s) Oral three times a day  senna 1 Tablet(s) Oral at bedtime  sertraline 50 milliGRAM(s) Oral daily  tamsulosin 0.4 milliGRAM(s) Oral at bedtime    MEDICATIONS  (PRN):  acetaminophen     Tablet .. 650 milliGRAM(s) Oral every 6 hours PRN Mild Pain (1 - 3)  bisacodyl Suppository 10 milliGRAM(s) Rectal daily PRN Constipation  magnesium hydroxide Suspension 30 milliLiter(s) Oral daily PRN Constipation  OLANZapine Injectable 2.5 milliGRAM(s) IntraMuscular every 8 hours PRN agitation      REVIEW OF SYSTEMS:  CONSTITUTIONAL: No fever or chills  HEENT:  No headache, no sore throat  RESPIRATORY: No cough, wheezing, or shortness of breath  CARDIOVASCULAR: No chest pain, palpitations  GASTROINTESTINAL: No abdominal pain, nausea, vomiting, or diarrhea  GENITOURINARY: No dysuria, frequency, or hematuria  NEUROLOGICAL: no focal weakness or dizziness  MUSCULOSKELETAL: no myalgias  PSYCH: no recent changes in mood    RADIOLOGY & ADDITIONAL TESTS:    Imaging Personally Reviewed:  [x] YES  [ ] NO    Consultant(s) Notes Reviewed:  [x] YES  [ ] NO    PHYSICAL EXAM:  T(C): 36.3 (05-07-23 @ 04:49), Max: 36.7 (05-06-23 @ 13:23)  HR: 78 (05-07-23 @ 04:49) (64 - 78)  BP: 131/75 (05-07-23 @ 04:49) (117/79 - 131/75)  RR: 18 (05-07-23 @ 04:49) (18 - 18)  SpO2: 97% (05-07-23 @ 04:49) (97% - 98%)    GENERAL: NAD, well-developed, well-groomed  HEENT:  anicteric, moist mucous membranes  CHEST/LUNG:  CTA b/l, no rales, wheezes, or rhonchi  HEART:  RRR, S1, S2  ABDOMEN:  BS+, soft, nontender, nondistended  EXTREMITIES: no edema, cyanosis, or calf tenderness  NERVOUS SYSTEM: confused  PSYCH: normal affect    Care Discussed with Consultants/Other Providers [x] YES  [ ] NO

## 2023-05-08 LAB
ANION GAP SERPL CALC-SCNC: 5 MMOL/L — SIGNIFICANT CHANGE UP (ref 5–17)
BUN SERPL-MCNC: 31 MG/DL — HIGH (ref 7–23)
CALCIUM SERPL-MCNC: 8.3 MG/DL — LOW (ref 8.5–10.1)
CHLORIDE SERPL-SCNC: 116 MMOL/L — HIGH (ref 96–108)
CO2 SERPL-SCNC: 21 MMOL/L — LOW (ref 22–31)
CREAT SERPL-MCNC: 1.8 MG/DL — HIGH (ref 0.5–1.3)
CULTURE RESULTS: SIGNIFICANT CHANGE UP
CULTURE RESULTS: SIGNIFICANT CHANGE UP
EGFR: 38 ML/MIN/1.73M2 — LOW
GLUCOSE SERPL-MCNC: 81 MG/DL — SIGNIFICANT CHANGE UP (ref 70–99)
HCT VFR BLD CALC: 24.7 % — LOW (ref 39–50)
HCT VFR BLD CALC: 29 % — LOW (ref 39–50)
HGB BLD-MCNC: 7.5 G/DL — LOW (ref 13–17)
HGB BLD-MCNC: 9 G/DL — LOW (ref 13–17)
MCHC RBC-ENTMCNC: 28.4 PG — SIGNIFICANT CHANGE UP (ref 27–34)
MCHC RBC-ENTMCNC: 29 PG — SIGNIFICANT CHANGE UP (ref 27–34)
MCHC RBC-ENTMCNC: 30.4 GM/DL — LOW (ref 32–36)
MCHC RBC-ENTMCNC: 31 GM/DL — LOW (ref 32–36)
MCV RBC AUTO: 93.5 FL — SIGNIFICANT CHANGE UP (ref 80–100)
MCV RBC AUTO: 93.6 FL — SIGNIFICANT CHANGE UP (ref 80–100)
NRBC # BLD: 0 /100 WBCS — SIGNIFICANT CHANGE UP (ref 0–0)
NRBC # BLD: 0 /100 WBCS — SIGNIFICANT CHANGE UP (ref 0–0)
OB PNL STL: NEGATIVE — SIGNIFICANT CHANGE UP
PLATELET # BLD AUTO: 159 K/UL — SIGNIFICANT CHANGE UP (ref 150–400)
PLATELET # BLD AUTO: 206 K/UL — SIGNIFICANT CHANGE UP (ref 150–400)
POTASSIUM SERPL-MCNC: 3.8 MMOL/L — SIGNIFICANT CHANGE UP (ref 3.5–5.3)
POTASSIUM SERPL-SCNC: 3.8 MMOL/L — SIGNIFICANT CHANGE UP (ref 3.5–5.3)
RBC # BLD: 2.64 M/UL — LOW (ref 4.2–5.8)
RBC # BLD: 3.1 M/UL — LOW (ref 4.2–5.8)
RBC # FLD: 16.5 % — HIGH (ref 10.3–14.5)
RBC # FLD: 17.1 % — HIGH (ref 10.3–14.5)
SODIUM SERPL-SCNC: 142 MMOL/L — SIGNIFICANT CHANGE UP (ref 135–145)
SPECIMEN SOURCE: SIGNIFICANT CHANGE UP
SPECIMEN SOURCE: SIGNIFICANT CHANGE UP
WBC # BLD: 5.9 K/UL — SIGNIFICANT CHANGE UP (ref 3.8–10.5)
WBC # BLD: 5.96 K/UL — SIGNIFICANT CHANGE UP (ref 3.8–10.5)
WBC # FLD AUTO: 5.9 K/UL — SIGNIFICANT CHANGE UP (ref 3.8–10.5)
WBC # FLD AUTO: 5.96 K/UL — SIGNIFICANT CHANGE UP (ref 3.8–10.5)

## 2023-05-08 PROCEDURE — 99221 1ST HOSP IP/OBS SF/LOW 40: CPT

## 2023-05-08 PROCEDURE — 93010 ELECTROCARDIOGRAM REPORT: CPT

## 2023-05-08 PROCEDURE — 99233 SBSQ HOSP IP/OBS HIGH 50: CPT

## 2023-05-08 RX ORDER — ERYTHROPOIETIN 10000 [IU]/ML
10000 INJECTION, SOLUTION INTRAVENOUS; SUBCUTANEOUS
Refills: 0 | Status: DISCONTINUED | OUTPATIENT
Start: 2023-05-08 | End: 2023-05-15

## 2023-05-08 RX ORDER — OLANZAPINE 15 MG/1
2.5 TABLET, FILM COATED ORAL EVERY 8 HOURS
Refills: 0 | Status: DISCONTINUED | OUTPATIENT
Start: 2023-05-08 | End: 2023-05-09

## 2023-05-08 RX ORDER — RISPERIDONE 4 MG/1
1 TABLET ORAL THREE TIMES A DAY
Refills: 0 | Status: DISCONTINUED | OUTPATIENT
Start: 2023-05-08 | End: 2023-05-09

## 2023-05-08 RX ADMIN — Medication 0.25 MILLIGRAM(S): at 15:06

## 2023-05-08 RX ADMIN — NYSTATIN CREAM 1 APPLICATION(S): 100000 CREAM TOPICAL at 06:43

## 2023-05-08 RX ADMIN — RISPERIDONE 0.5 MILLIGRAM(S): 4 TABLET ORAL at 06:42

## 2023-05-08 RX ADMIN — Medication 500 MILLIGRAM(S): at 06:42

## 2023-05-08 RX ADMIN — MEMANTINE HYDROCHLORIDE 10 MILLIGRAM(S): 10 TABLET ORAL at 13:35

## 2023-05-08 RX ADMIN — SENNA PLUS 1 TABLET(S): 8.6 TABLET ORAL at 22:11

## 2023-05-08 RX ADMIN — RISPERIDONE 1 MILLIGRAM(S): 4 TABLET ORAL at 22:16

## 2023-05-08 RX ADMIN — FAMOTIDINE 20 MILLIGRAM(S): 10 INJECTION INTRAVENOUS at 13:33

## 2023-05-08 RX ADMIN — Medication 0.6 MILLIGRAM(S): at 13:33

## 2023-05-08 RX ADMIN — Medication 100 MILLIGRAM(S): at 06:42

## 2023-05-08 RX ADMIN — ATORVASTATIN CALCIUM 10 MILLIGRAM(S): 80 TABLET, FILM COATED ORAL at 22:11

## 2023-05-08 RX ADMIN — Medication 5 MILLIGRAM(S): at 22:16

## 2023-05-08 RX ADMIN — Medication 1 TABLET(S): at 13:33

## 2023-05-08 RX ADMIN — TAMSULOSIN HYDROCHLORIDE 0.4 MILLIGRAM(S): 0.4 CAPSULE ORAL at 22:10

## 2023-05-08 RX ADMIN — OLANZAPINE 2.5 MILLIGRAM(S): 15 TABLET, FILM COATED ORAL at 17:07

## 2023-05-08 RX ADMIN — ERYTHROPOIETIN 10000 UNIT(S): 10000 INJECTION, SOLUTION INTRAVENOUS; SUBCUTANEOUS at 17:21

## 2023-05-08 RX ADMIN — Medication 300 MILLIGRAM(S): at 13:32

## 2023-05-08 RX ADMIN — SERTRALINE 50 MILLIGRAM(S): 25 TABLET, FILM COATED ORAL at 13:32

## 2023-05-08 RX ADMIN — RISPERIDONE 0.5 MILLIGRAM(S): 4 TABLET ORAL at 13:33

## 2023-05-08 RX ADMIN — Medication 0.5 MILLIGRAM(S): at 22:41

## 2023-05-08 RX ADMIN — NYSTATIN CREAM 1 APPLICATION(S): 100000 CREAM TOPICAL at 17:05

## 2023-05-08 RX ADMIN — Medication 0.5 MILLIGRAM(S): at 01:21

## 2023-05-08 NOTE — BH CONSULTATION LIAISON ASSESSMENT NOTE - NS ED [facility] MED ROS PSYCHIATRIC
fevers no medicine PTA for fever. mom reports no other symptoms See HPI fevers no medicine PTA for fever. mom reports no other symptoms.  mom gave motrin while in waiting room. @ 2019

## 2023-05-08 NOTE — BH CONSULTATION LIAISON ASSESSMENT NOTE - CURRENT MEDICATION
MEDICATIONS  (STANDING):  allopurinol 300 milliGRAM(s) Oral daily  ascorbic acid 500 milliGRAM(s) Oral two times a day  atorvastatin 10 milliGRAM(s) Oral at bedtime  colchicine 0.6 milliGRAM(s) Oral daily  epoetin jane-epbx (RETACRIT) Injectable 91611 Unit(s) SubCutaneous <User Schedule>  famotidine    Tablet 20 milliGRAM(s) Oral daily  melatonin 5 milliGRAM(s) Oral at bedtime  memantine 10 milliGRAM(s) Oral daily  metoprolol succinate  milliGRAM(s) Oral daily  multivitamin 1 Tablet(s) Oral daily  nystatin Powder 1 Application(s) Topical two times a day  risperiDONE   Tablet 1 milliGRAM(s) Oral three times a day  senna 1 Tablet(s) Oral at bedtime  sertraline 50 milliGRAM(s) Oral daily  tamsulosin 0.4 milliGRAM(s) Oral at bedtime    MEDICATIONS  (PRN):  acetaminophen     Tablet .. 650 milliGRAM(s) Oral every 6 hours PRN Mild Pain (1 - 3)  bisacodyl Suppository 10 milliGRAM(s) Rectal daily PRN Constipation  LORazepam     Tablet 0.5 milliGRAM(s) Oral two times a day PRN Agitation  magnesium hydroxide Suspension 30 milliLiter(s) Oral daily PRN Constipation  OLANZapine Injectable 2.5 milliGRAM(s) IntraMuscular every 8 hours PRN agitation

## 2023-05-08 NOTE — CONSULT NOTE ADULT - ASSESSMENT
CKD 3, Prerenal azotemia  Anemia  Dementia  Hypertension    Renal indices improved. Likely at baseline. To continue current meds. Monitor h/h trend. Transfuse PRBC's PRN. Check iron sat.   Monitor BP trend. Titrate BP meds as needed. Will follow electrolytes and renal function trend. Encourage PO intake as tolerated.     Further recommendations pending clinical course. Thank you for the courtesy of this referral. 
Patient presents with  1.  Left dorsal hand skin teaR ISTAP 2 WOUND MEASURES 2 x 2 scant serosanguinous drainage no odor no induration no fluctuance periwound erythema patient shows no s/s of discomfort pain  recommendation:   -xeroform QOD  At risk for altered tissue perfusion YES  Impaired perfusion of peripheral tissue /YES  Continue  Nutrition (as tolerated)  Continue  Offloading   Continue Pericare  Apply cair boots at all times while in bed.   Provide skin checks and foot placement q8h.  Turn and reposition Q2h  Maintain moisture free environment  Care as per medicine will follow w/ you  Findings and recommendations discussed with ANUP Hoffmann  Thank you for this consult  Lia Ramos NP, [facility] [phone]

## 2023-05-08 NOTE — PROGRESS NOTE ADULT - ASSESSMENT
78 y/o PMH dementia, HTN, Afib s/p [employer] 2020, GERD, CKD, anxiety, schizophrenia who was admitted for AMS/agitation. Patient calm and cooperative at bedside. sister reports almost back to baseline     A/P:  metabolic encephalopathy   dementia  +parainfluenza virus     - UCx showing ESBL proteus. appreciate ID eval. likely bacteruria. no need for abx.     - cont current meds    - psych consulted. cont Risperdal to 0.5mg TID     - attempt to avoid IM meds       afib     - cont metoprolol      acute on chronic renal disease    - h/o renal osteodystrophy     - cont BMP. avoid nephrotoxic medications     - Cr stable.     anemia    - hgb stable. continue to monitor    - stool occult pending. repeat hgb. no signs of active bleeding.     HLD    - cont pravastatin     DVT proph: SCDs 78 y/o PMH dementia, HTN, Afib s/p [employer] 2020, GERD, CKD, anxiety, schizophrenia who was admitted for AMS/agitation. Patient calm and cooperative at bedside. sister reports almost back to baseline     A/P:  metabolic encephalopathy   dementia  +parainfluenza virus     - UCx showing ESBL proteus. appreciate ID eval. likely bacteruria. no need for abx.     - cont current meds    - psych consulted. cont Risperdal to 0.5mg TID     - attempt to avoid IM meds       afib     - cont metoprolol      acute on chronic renal disease    - h/o renal osteodystrophy     - cont BMP. avoid nephrotoxic medications     - Cr stable.     anemia    - hgb stable. continue to monitor    - stool occult pending. repeat hgb. no signs of active bleeding. ?secondary to renal disease. nephro consulted Dr Metzger. ?Aranesp    HLD    - cont pravastatin     DVT proph: SCDs

## 2023-05-08 NOTE — PROGRESS NOTE ADULT - SUBJECTIVE AND OBJECTIVE BOX
Patient is a 77y old  Male who presents with a chief complaint of agitation, CKD, anemia (07 May 2023 16:23)    INTERVAL HPI/OVERNIGHT EVENTS: Patient was seen and examined. overnight events reviewed. received IM haldol secondary to agitation. now calmer at bedside chair. eating. confused.     I&O's Summary    07 May 2023 07:01  -  08 May 2023 07:00  --------------------------------------------------------  IN: 0 mL / OUT: 100 mL / NET: -100 mL        LABS:                        7.5    5.96  )-----------( 159      ( 08 May 2023 05:26 )             24.7     05-08    142  |  116<H>  |  31<H>  ----------------------------<  81  3.8   |  21<L>  |  1.80<H>    Ca    8.3<L>      08 May 2023 05:26    TPro  5.4<L>  /  Alb  2.7<L>  /  TBili  0.5  /  DBili  x   /  AST  20  /  ALT  25  /  AlkPhos  63  05-07        CAPILLARY BLOOD GLUCOSE      MEDICATIONS  (STANDING):  allopurinol 300 milliGRAM(s) Oral daily  ascorbic acid 500 milliGRAM(s) Oral two times a day  atorvastatin 10 milliGRAM(s) Oral at bedtime  colchicine 0.6 milliGRAM(s) Oral daily  famotidine    Tablet 20 milliGRAM(s) Oral daily  melatonin 5 milliGRAM(s) Oral at bedtime  memantine 10 milliGRAM(s) Oral daily  metoprolol succinate  milliGRAM(s) Oral daily  multivitamin 1 Tablet(s) Oral daily  nystatin Powder 1 Application(s) Topical two times a day  risperiDONE   Tablet 0.5 milliGRAM(s) Oral three times a day  senna 1 Tablet(s) Oral at bedtime  sertraline 50 milliGRAM(s) Oral daily  tamsulosin 0.4 milliGRAM(s) Oral at bedtime    MEDICATIONS  (PRN):  acetaminophen     Tablet .. 650 milliGRAM(s) Oral every 6 hours PRN Mild Pain (1 - 3)  bisacodyl Suppository 10 milliGRAM(s) Rectal daily PRN Constipation  magnesium hydroxide Suspension 30 milliLiter(s) Oral daily PRN Constipation      REVIEW OF SYSTEMS:  unable to obtain secondary to mental status     RADIOLOGY & ADDITIONAL TESTS:    Imaging Personally Reviewed:  [x] YES  [ ] NO    Consultant(s) Notes Reviewed:  [x] YES  [ ] NO    PHYSICAL EXAM:  T(C): 36.4 (05-08-23 @ 06:47), Max: 36.7 (05-07-23 @ 20:19)  HR: 76 (05-08-23 @ 06:47) (66 - 76)  BP: 124/74 (05-08-23 @ 06:47) (106/61 - 124/74)  RR: 18 (05-08-23 @ 06:47) (18 - 18)  SpO2: 96% (05-08-23 @ 06:47) (95% - 98%)    GENERAL: NAD, well-developed, well-groomed  HEENT:  anicteric, moist mucous membranes  CHEST/LUNG:  CTA b/l, no rales, wheezes, or rhonchi  HEART:  RRR, S1, S2  ABDOMEN:  BS+, soft, nontender, nondistended  EXTREMITIES: no edema, cyanosis, or calf tenderness  NERVOUS SYSTEM: confused  PSYCH: normal affect    Care Discussed with Consultants/Other Providers [x] YES  [ ] NO

## 2023-05-08 NOTE — BH CONSULTATION LIAISON ASSESSMENT NOTE - NSICDXPASTSURGICALHX_GEN_ALL_CORE_FT
PAST SURGICAL HISTORY:  h/o Arthroscopy left knee 2004     h/o Arthroscopy right knee 2007     h/o Cerebral aneurysm coiling 2/18/2011 and 7/15/2011 [facility]    No significant past surgical history     total knee arthroplasty left 3/5/2012

## 2023-05-08 NOTE — BH CONSULTATION LIAISON ASSESSMENT NOTE - NSBHCHARTREVIEWVS_PSY_A_CORE FT
Vital Signs Last 24 Hrs  T(C): 36.3 (08 May 2023 13:32), Max: 36.7 (07 May 2023 20:19)  T(F): 97.4 (08 May 2023 13:32), Max: 98 (07 May 2023 20:19)  HR: 63 (08 May 2023 13:32) (63 - 76)  BP: 137/88 (08 May 2023 13:32) (106/61 - 137/88)  BP(mean): --  RR: 18 (08 May 2023 13:32) (18 - 18)  SpO2: 97% (08 May 2023 13:32) (96% - 98%)    Parameters below as of 08 May 2023 13:32  Patient On (Oxygen Delivery Method): room air

## 2023-05-08 NOTE — BH CONSULTATION LIAISON ASSESSMENT NOTE - NSBHCHARTREVIEWLAB_PSY_A_CORE FT
9.0    5.90  )-----------( 206      ( 08 May 2023 12:54 )             29.0   05-08    142  |  116<H>  |  31<H>  ----------------------------<  81  3.8   |  21<L>  |  1.80<H>    Ca    8.3<L>      08 May 2023 05:26    TPro  5.4<L>  /  Alb  2.7<L>  /  TBili  0.5  /  DBili  x   /  AST  20  /  ALT  25  /  AlkPhos  63  05-07

## 2023-05-08 NOTE — CONSULT NOTE ADULT - SUBJECTIVE AND OBJECTIVE BOX
HPI:  77-year-old white male with history of dementia hypertension atrial fibrillation s/p Watchman 2020 anemia BPH hyperlipidemia GERD gout schizophrenia anxiety disorder CKD as well as renal osteodystrophy sent to the emergency department here at [facility] today for evaluation of "altered mental status".  Per transfer papers from [facility] as well as from questioning EMS who transported patient here patient has been more aggressive in terms of behavior and they felt he may be in danger to himself and potentially others.  Wife got called by the facility this AM and heard pt screaming "everyone is dead here" and threatening to call the police. Niece at bedside also reports accounts over the wknd of pt undressing himself including his diaper and smearing feces around the facility. Family reports ICU admission at [facility] march 2023 2/2 sepsis. They also recount rapid and progressive decline in health and hygiene w/>70lb weight loss in recent months and worsening dementia. No additional subjective history is available at this time secondary to patient's baseline dementia.  Pt is currently AAOX1 to self and tearful. patient presents with  Left dorsal hand skin teaR ISTAP 2 WOUND MEASURES 2 x 2 scant serosanguinous drainage no odor no induration no fluctuance periwound erythema patient shows no s/s of discomfort pain      PAST MEDICAL & SURGICAL HISTORY:  Prostate enlargement      Arthritis      HLD (hyperlipidemia)      Gout      h/o HTN (hypertension)      Obesity      H/o Cerebral aneurysm      HTN (hypertension)      Atrial fibrillation      Dementia      Schizophrenia      h/o Cerebral aneurysm coiling  2/18/2011 and 7/15/2011 [facility]      h/o Arthroscopy left knee 2004      h/o Arthroscopy right knee 2007      total knee arthroplasty left 3/5/2012      No significant past surgical history    REVIEW OF SYSTEMS  Patient is unable to provide any information/ROS  due to baseline mental status      MEDICATIONS  (STANDING):  allopurinol 300 milliGRAM(s) Oral daily  ALPRAZolam 0.25 milliGRAM(s) Oral at bedtime  ascorbic acid 500 milliGRAM(s) Oral two times a day  atorvastatin 10 milliGRAM(s) Oral at bedtime  clotrimazole/betamethasone Cream 1 Application(s) Topical two times a day  colchicine 0.6 milliGRAM(s) Oral daily  famotidine    Tablet 20 milliGRAM(s) Oral daily  heparin   Injectable 5000 Unit(s) SubCutaneous every 8 hours  lactated ringers. 1000 milliLiter(s) (50 mL/Hr) IV Continuous <Continuous>  memantine 10 milliGRAM(s) Oral daily  metoprolol succinate  milliGRAM(s) Oral daily  multivitamin 1 Tablet(s) Oral daily  nystatin Cream 1 Application(s) Topical two times a day  risperiDONE   Tablet 0.25 milliGRAM(s) Oral two times a day  senna 1 Tablet(s) Oral at bedtime  sertraline 50 milliGRAM(s) Oral daily  tamsulosin 0.4 milliGRAM(s) Oral at bedtime    MEDICATIONS  (PRN):  acetaminophen     Tablet .. 650 milliGRAM(s) Oral every 6 hours PRN Mild Pain (1 - 3)  bisacodyl Suppository 10 milliGRAM(s) Rectal daily PRN Constipation  magnesium hydroxide Suspension 30 milliLiter(s) Oral daily PRN Constipation      Allergies    No Known Allergies    Intolerances        SOCIAL HISTORY:      FAMILY HISTORY:      Vital Signs Last 24 Hrs  T(C): 36.9 (04 May 2023 12:55), Max: 36.9 (03 May 2023 15:29)  T(F): 98.4 (04 May 2023 12:55), Max: 98.4 (03 May 2023 15:29)  HR: 73 (04 May 2023 12:55) (71 - 91)  BP: 126/67 (04 May 2023 12:55) (126/67 - 149/91)  BP(mean): --  RR: 18 (04 May 2023 12:55) (18 - 18)  SpO2: 98% (04 May 2023 12:55) (95% - 100%)    Parameters below as of 04 May 2023 12:55  Patient On (Oxygen Delivery Method): room air                              7.6    5.34  )-----------( 179      ( 04 May 2023 05:52 )             24.3     05-04    144  |  117<H>  |  26<H>  ----------------------------<  97  3.7   |  23  |  1.90<H>    Ca    8.5      04 May 2023 05:52    TPro  6.2  /  Alb  3.0<L>  /  TBili  0.5  /  DBili  x   /  AST  16  /  ALT  24  /  AlkPhos  66  05-03    Auto Neutrophil #: 3.00 K/uL (05-04-23 @ 05:52)        PHYSICAL EXAM:    General: resting comfortably in bed; NAD  ENT: no nasal discharge; hearing intact  Neck: Supple  Extremities:  no gross deformities,   Dermatologic: Left dorsal hand skin teaR ISTAP 2 WOUND MEASURES 2 x 2 scant serosanguinous drainage no odor no induration no fluctuance periwound erythema  Neurologic: Can not follow commands,   Musculoskeletal/Vascular: no  contractures                    
Patient is a 77y old  Male who presents with a chief complaint of agitation, CKD, anemia (08 May 2023 11:35)    HPI:  77-year-old white male with history of dementia hypertension atrial fibrillation s/p Watchman 2020 anemia BPH hyperlipidemia GERD gout schizophrenia anxiety disorder CKD as well as renal osteodystrophy sent to the emergency department here at [facility] today for evaluation of "altered mental status".  Per transfer papers from [facility] as well as from questioning EMS who transported patient here patient has been more aggressive in terms of behavior and they felt he may be in danger to himself and potentially others.  Wife got called by the facility this AM and heard pt screaming "everyone is dead here" and threatening to call the police. Niece at bedside also reports accounts over the wknd of pt undressing himself including his diaper and smearing feces around the facility. Family reports ICU admission at [facility] march 2023 2/2 sepsis. They also recount rapid and progressive decline in health and hygiene w/>70lb weight loss in recent months and worsening dementia. No additional subjective history is available at this time secondary to patient's baseline dementia.  Pt is currently AAOX1 to self and tearful saying he is going to die and asking for his wife.    In the ED,  Vitals: 97.9F rectal, HR 90 bpm, NIBP 117/80, RR 18 breaths/min, SpO2 100% on RA  labs significant for hgb 8.6, Cr 2.3  UA: neg for UTI  + parainfluenza  s/p 2.4L LR bolus  CT head non con: Anterior parasellar aneurysm coil. No acute hemorrhage, extra-axial collection or mass effect. Bilateral maxillary sinus air-fluid levels.   CXR: No acute finding or change.  ECG: AFib 78ms, Qtc 461ms  (03 May 2023 15:11)    Renal consult called for chronic kidney disease stage 3. History obtained from chart.       PAST MEDICAL HISTORY:  Prostate enlargement    Arthritis    Arthritis    HLD (hyperlipidemia)    Gout    h/o HTN (hypertension)    Obesity    H/o Cerebral aneurysm    HTN (hypertension)    Atrial fibrillation    Dementia    Schizophrenia        PAST SURGICAL HISTORY:  h/o Aneurysm, cerebral, nonruptured    h/o Cerebral aneurysm coiling    h/o Arthroscopy left knee 2004    h/o Arthroscopy right knee 2007    total knee arthroplasty left 3/5/2012    No significant past surgical history        FAMILY HISTORY:      SOCIAL HISTORY: No smoking or alcohol use     Allergies    No Known Allergies    Intolerances      Home Medications:  allopurinol 300 mg oral tablet: 1 orally once a day (03 May 2023 15:34)  ALPRAZolam 0.25 mg oral tablet: 1 orally once a day (at bedtime) (03 May 2023 15:35)  colchicine 0.6 mg oral capsule: 1 orally once a day (03 May 2023 15:34)  Dulcolax Laxative 10 mg rectal suppository: 1 rectally once a day as needed for  constipation (03 May 2023 15:34)  famotidine 20 mg oral tablet: 1 orally once a day (03 May 2023 15:34)  Fleet Bisacodyl 10 mg rectal enema: 10 rectally once a day as needed for  constipation (03 May 2023 15:34)  Gemtesa 75 mg oral tablet: 1 orally once a day (03 May 2023 15:34)  Lotrisone 1%-0.05% topical cream: Apply topically to affected area 2 times a day (03 May 2023 15:34)  magnesium hydroxide 400 mg oral tablet, chewable: 1 tab(s) orally once a day as needed for  constipation (03 May 2023 15:34)  memantine 10 mg oral tablet: 1 orally once a day (03 May 2023 15:34)  metoprolol succinate 100 mg oral tablet, extended release: 1 orally once a day (03 May 2023 15:34)  Multiple Vitamins oral capsule: 1 orally once a day (03 May 2023 15:34)  nystatin 100,000 units/g topical cream: Apply topically to affected area 2 times a day to groin (03 May 2023 15:34)  pravastatin 40 mg oral tablet: 1 orally once a day (03 May 2023 15:34)  risperiDONE 0.25 mg oral tablet: 1 orally 2 times a day (03 May 2023 15:35)  senna (sennosides) 8.6 mg oral tablet: 1 orally once a day (03 May 2023 15:34)  sertraline 50 mg oral tablet: 1 orally once a day (03 May 2023 15:35)  tamsulosin 0.4 mg oral capsule: 1 orally once a day (03 May 2023 15:34)  Vitamin C 500 mg oral capsule: 1 orally 2 times a day (03 May 2023 15:34)    MEDICATIONS  (STANDING):  allopurinol 300 milliGRAM(s) Oral daily  ascorbic acid 500 milliGRAM(s) Oral two times a day  atorvastatin 10 milliGRAM(s) Oral at bedtime  colchicine 0.6 milliGRAM(s) Oral daily  famotidine    Tablet 20 milliGRAM(s) Oral daily  melatonin 5 milliGRAM(s) Oral at bedtime  memantine 10 milliGRAM(s) Oral daily  metoprolol succinate  milliGRAM(s) Oral daily  multivitamin 1 Tablet(s) Oral daily  nystatin Powder 1 Application(s) Topical two times a day  risperiDONE   Tablet 0.5 milliGRAM(s) Oral three times a day  senna 1 Tablet(s) Oral at bedtime  sertraline 50 milliGRAM(s) Oral daily  tamsulosin 0.4 milliGRAM(s) Oral at bedtime    MEDICATIONS  (PRN):  acetaminophen     Tablet .. 650 milliGRAM(s) Oral every 6 hours PRN Mild Pain (1 - 3)  bisacodyl Suppository 10 milliGRAM(s) Rectal daily PRN Constipation  magnesium hydroxide Suspension 30 milliLiter(s) Oral daily PRN Constipation      REVIEW OF SYSTEMS:  General: no distress  unable to obtain    T(F): 97.6 (05-08-23 @ 06:47), Max: 98 (05-07-23 @ 20:19)  HR: 76 (05-08-23 @ 06:47) (66 - 76)  BP: 124/74 (05-08-23 @ 06:47) (106/61 - 124/74)  RR: 18 (05-08-23 @ 06:47) (18 - 18)  SpO2: 96% (05-08-23 @ 06:47) (95% - 98%)  Wt(kg): --    PHYSICAL EXAM:  General: NAD  Respiratory: b/l air entry  Cardiovascular: S1 S2  Gastrointestinal: soft  Extremities: + edema        05-08    142  |  116<H>  |  31<H>  ----------------------------<  81  3.8   |  21<L>  |  1.80<H>    Ca    8.3<L>      08 May 2023 05:26    TPro  5.4<L>  /  Alb  2.7<L>  /  TBili  0.5  /  DBili  x   /  AST  20  /  ALT  25  /  AlkPhos  63  05-07                          7.5    5.96  )-----------( 159      ( 08 May 2023 05:26 )             24.7       Hemoglobin: 7.5 g/dL (05-08 @ 05:26)  Hematocrit: 24.7 % (05-08 @ 05:26)  Blood Urea Nitrogen, Serum: 31 mg/dL (05-08 @ 05:26)  Calcium, Total Serum: 8.3 mg/dL (05-08 @ 05:26)      Creatinine, Serum: 1.80 (05-08 @ 05:26)  Creatinine, Serum: 1.80 (05-07 @ 08:54)  Creatinine, Serum: 1.80 (05-06 @ 05:10)        LIVER FUNCTIONS - ( 07 May 2023 08:54 )  Alb: 2.7 g/dL / Pro: 5.4 g/dL / ALK PHOS: 63 U/L / ALT: 25 U/L / AST: 20 U/L / GGT: x                       I&O's Detail    07 May 2023 07:01  -  08 May 2023 07:00  --------------------------------------------------------  IN:  Total IN: 0 mL    OUT:    Voided (mL): 100 mL  Total OUT: 100 mL    Total NET: -100 mL            Culture - Urine (collected 03 May 2023 10:40)  Source: Clean Catch Clean Catch (Midstream)  Final Report (06 May 2023 09:20):    10,000 - 49,000 CFU/mL Proteus mirabilis ESBL    10,000 - 49,000 CFU/mL Coag Negative Staphylococcus "Susceptibilities not    performed"  Organism: Proteus mirabilis ESBL (06 May 2023 09:20)  Organism: Proteus mirabilis ESBL (06 May 2023 09:20)    Sensitivities:      Method Type: STEPHEN      -  Amikacin: S <=16      -  Amoxicillin/Clavulanic Acid: S <=8/4      -  Ampicillin: R >16 These ampicillin results predict results for amoxicillin      -  Ampicillin/Sulbactam: S <=4/2 Enterobacter, Klebsiella aerogenes, Citrobacter, and Serratia may develop resistance during prolonged therapy (3-4 days)      -  Aztreonam: R >16      -  Cefazolin: R >16 For uncomplicated UTI with K. pneumoniae, E. coli, or P. mirablis: STEPHEN <=16 is sensitive and STEPHEN >=32 is resistant. This also predicts results for oral agents cefaclor, cefdinir, cefpodoxime, cefprozil, cefuroxime axetil, cephalexin and locarbef for uncomplicated UTI. Note that some isolates may be susceptible to these agents while testing resistant to cefazolin.      -  Cefepime: R >16      -  Ceftriaxone: R >32 Enterobacter, Klebsiella aerogenes, Citrobacter, and Serratia may develop resistance during prolonged therapy      -  Cefuroxime: R >16      -  Ciprofloxacin: R >2      -  Ertapenem: S <=0.5      -  Gentamicin: R >8      -  Levofloxacin: R >4      -  Meropenem: S <=1      -  Nitrofurantoin: R >64 Should not be used to treat pyelonephritis      -  Piperacillin/Tazobactam: SDD 16      -  Tobramycin: R >8      -  Trimethoprim/Sulfamethoxazole: R >2/38    Culture - Blood (collected 03 May 2023 08:30)  Source: .Blood Blood-Peripheral  Preliminary Report (04 May 2023 12:02):    No growth to date.    Culture - Blood (collected 03 May 2023 08:25)  Source: .Blood Blood-Peripheral  Preliminary Report (04 May 2023 12:02):    No growth to date.        < from: Xray Chest 1 View-PORTABLE IMMEDIATE (05.03.23 @ 09:17) >    ACC: [id number] EXAM:  XR CHEST PORTABLE IMMED 1V   ORDERED BY: KWAN THOMAS     PROCEDURE DATE:  05/03/2023          INTERPRETATION:  AP chest on May 3, 2023 at 9:28 AM. 2 images. Patient   has sepsis and altered mental status.    Heart magnified by technique.    Lungs remain clear.    Chest is similar to August 28, 2012.    IMPRESSION: No acute finding or change.    --- End of Report ---            ALDAIR DURAN MD; Attending Radiologist  This document has been electronically signed. May  3 [id number]    < end of copied text >          
    HPI:  76 y/o PMH dementia, HTN, Afib s/p wachsman 2020, GERD, CKD, anxiety, schizophrenia  admitted for AMS/agitation. RVP + parainfluenza. Urine culture with Proteus ESBL. Patient calm and cooperative at bedside. Mental status back to baseline. No fever chills n/v/d. Pt unable to provide history.    Infectious Disease consult was called to evaluate pt.    Past Medical & Surgical Hx:  PAST MEDICAL & SURGICAL HISTORY:  Prostate enlargement  Arthritis  HLD (hyperlipidemia)  Gout  h/o HTN (hypertension)  Obesity  H/o Cerebral aneurysm  HTN (hypertension)  Atrial fibrillation  Dementia  Schizophrenia  h/o Cerebral aneurysm coiling  2/18/2011 and 7/15/2011 [facility]  h/o Arthroscopy left knee 2004  h/o Arthroscopy right knee 2007  total knee arthroplasty left 3/5/2012  No significant past surgical histor      Social History--  EtOH: denies  Tobacco: denies  Drug Use: denies     FAMILY HISTORY:  Noncontributory    Allergies  No Known Allergies    Intolerances  NONE      Home Medications:  allopurinol 300 mg oral tablet: 1 orally once a day (03 May 2023 15:34)  ALPRAZolam 0.25 mg oral tablet: 1 orally once a day (at bedtime) (03 May 2023 15:35)  colchicine 0.6 mg oral capsule: 1 orally once a day (03 May 2023 15:34)  Dulcolax Laxative 10 mg rectal suppository: 1 rectally once a day as needed for  constipation (03 May 2023 15:34)  famotidine 20 mg oral tablet: 1 orally once a day (03 May 2023 15:34)  Fleet Bisacodyl 10 mg rectal enema: 10 rectally once a day as needed for  constipation (03 May 2023 15:34)  Gemtesa 75 mg oral tablet: 1 orally once a day (03 May 2023 15:34)  Lotrisone 1%-0.05% topical cream: Apply topically to affected area 2 times a day (03 May 2023 15:34)  magnesium hydroxide 400 mg oral tablet, chewable: 1 tab(s) orally once a day as needed for  constipation (03 May 2023 15:34)  memantine 10 mg oral tablet: 1 orally once a day (03 May 2023 15:34)  metoprolol succinate 100 mg oral tablet, extended release: 1 orally once a day (03 May 2023 15:34)  Multiple Vitamins oral capsule: 1 orally once a day (03 May 2023 15:34)  nystatin 100,000 units/g topical cream: Apply topically to affected area 2 times a day to groin (03 May 2023 15:34)  pravastatin 40 mg oral tablet: 1 orally once a day (03 May 2023 15:34)  risperiDONE 0.25 mg oral tablet: 1 orally 2 times a day (03 May 2023 15:35)  senna (sennosides) 8.6 mg oral tablet: 1 orally once a day (03 May 2023 15:34)  sertraline 50 mg oral tablet: 1 orally once a day (03 May 2023 15:35)  tamsulosin 0.4 mg oral capsule: 1 orally once a day (03 May 2023 15:34)  Vitamin C 500 mg oral capsule: 1 orally 2 times a day (03 May 2023 15:34)      Current Inpatient Medications :    ANTIBIOTICS:       OTHER RELEVANT MEDICATIONS :  acetaminophen     Tablet .. 650 milliGRAM(s) Oral every 6 hours PRN  allopurinol 300 milliGRAM(s) Oral daily  ascorbic acid 500 milliGRAM(s) Oral two times a day  atorvastatin 10 milliGRAM(s) Oral at bedtime  bisacodyl Suppository 10 milliGRAM(s) Rectal daily PRN  colchicine 0.6 milliGRAM(s) Oral daily  famotidine    Tablet 20 milliGRAM(s) Oral daily  haloperidol    Injectable 2 milliGRAM(s) IntraMuscular every 8 hours PRN  magnesium hydroxide Suspension 30 milliLiter(s) Oral daily PRN  melatonin 5 milliGRAM(s) Oral at bedtime  memantine 10 milliGRAM(s) Oral daily  metoprolol succinate  milliGRAM(s) Oral daily  multivitamin 1 Tablet(s) Oral daily  nystatin Powder 1 Application(s) Topical two times a day  OLANZapine Injectable 2.5 milliGRAM(s) IntraMuscular every 8 hours PRN  risperiDONE   Tablet 0.5 milliGRAM(s) Oral three times a day  senna 1 Tablet(s) Oral at bedtime  sertraline 50 milliGRAM(s) Oral daily  tamsulosin 0.4 milliGRAM(s) Oral at bedtime      ROS:  Unable to obtain due to : Dementia      I&O's Detail    06 May 2023 07:01  -  07 May 2023 07:00  --------------------------------------------------------  IN:  Total IN: 0 mL    OUT:    Voided (mL): 750 mL  Total OUT: 750 mL    Total NET: -750 mL      07 May 2023 07:01  -  07 May 2023 17:24  --------------------------------------------------------  IN:  Total IN: 0 mL    OUT:    Voided (mL): 100 mL  Total OUT: 100 mL    Total NET: -100 mL          Physical Exam:  Vital Signs Last 24 Hrs  T(C): 36.2 (07 May 2023 12:59), Max: 36.3 (07 May 2023 04:49)  T(F): 97.2 (07 May 2023 12:59), Max: 97.3 (07 May 2023 04:49)  HR: 66 (07 May 2023 12:59) (64 - 78)  BP: 114/72 (07 May 2023 12:59) (114/72 - 131/75)  RR: 18 (07 May 2023 12:59) (18 - 18)  SpO2: 95% (07 May 2023 12:59) (95% - 97%)    Parameters below as of 07 May 2023 12:59  Patient On (Oxygen Delivery Method): room air      General: no acute distress  Neck: supple, trachea midline  Lungs: clear, no wheeze/rhonchi  Cardiovascular: regular rate and rhythm, S1 S2  Abdomen: soft, nontender, ND, bowel sounds normal  Neurological:  awake alert confused  Skin: no rash  Extremities: Trace edema    Labs:                         8.5    5.47  )-----------( 185      ( 07 May 2023 08:54 )             27.7   05-07    141  |  115<H>  |  24<H>  ----------------------------<  109<H>  3.7   |  22  |  1.80<H>    Ca    8.6      07 May 2023 08:54    TPro  5.4<L>  /  Alb  2.7<L>  /  TBili  0.5  /  DBili  x   /  AST  20  /  ALT  25  /  AlkPhos  63  05-07      RECENT CULTURES:  Culture - Urine (05.03.23 @ 10:40)    -  Amikacin: S <=16   -  Amoxicillin/Clavulanic Acid: S <=8/4   -  Ampicillin: R >16 These ampicillin results predict results for amoxicillin   -  Ampicillin/Sulbactam: S <=4/2 Enterobacter, Klebsiella aerogenes, Citrobacter, and Serratia may develop resistance during prolonged therapy (3-4 days)   -  Aztreonam: R >16   -  Cefazolin: R >16 For uncomplicated UTI with K. pneumoniae, E. coli, or P. mirablis: STEPHEN <=16 is sensitive and STEPHEN >=32 is resistant. This also predicts results for oral agents cefaclor, cefdinir, cefpodoxime, cefprozil, cefuroxime axetil, cephalexin and locarbef for uncomplicated UTI. Note that some isolates may be susceptible to these agents while testing resistant to cefazolin.   -  Cefepime: R >16   -  Ceftriaxone: R >32 Enterobacter, Klebsiella aerogenes, Citrobacter, and Serratia may develop resistance during prolonged therapy   -  Cefuroxime: R >16   -  Ciprofloxacin: R >2   -  Ertapenem: S <=0.5   -  Gentamicin: R >8   -  Levofloxacin: R >4   -  Meropenem: S <=1   -  Nitrofurantoin: R >64 Should not be used to treat pyelonephritis   -  Piperacillin/Tazobactam: SDD 16   -  Tobramycin: R >8   -  Trimethoprim/Sulfamethoxazole: R >2/38   Specimen Source: Clean Catch Clean Catch (Midstream)   Culture Results:   10,000 - 49,000 CFU/mL Proteus mirabilis ESBL  10,000 - 49,000 CFU/mL Coag Negative Staphylococcus "Susceptibilities not  performed"   Organism Identification: Proteus mirabilis ESBL   Organism: Proteus mirabilis ESBL   Method Type: STEPHEN    Culture - Blood (05.03.23 @ 08:30)    Specimen Source: .Blood Blood-Peripheral   Culture Results:   No growth to date.    Respiratory Viral Panel with COVID-19 by JOSE (05.03.23 @ 08:30)    Rapid RVP Result: Detected   SARS-CoV-2: [facility]: This Respiratory Panel uses polymerase chain reaction (PCR) to detect for  adenovirus; coronavirus (HKU1, NL63, 229E, OC43); human metapneumovirus  (hMPV); human enterovirus/rhinovirus (Entero/RV); influenza A; influenza  A/H1; influenza A/H3; influenza A/H1-2009; influenza B; parainfluenza  viruses 1, 2, 3, 4; respiratory syncytial virus; Mycoplasma pneumoniae;  Chlamydophila pneumoniae; and SARS-CoV-2.   Parainfluenza 3 (RapRVP): Detected      RADIOLOGY & ADDITIONAL STUDIES:    ACC: [id number] EXAM:  XR CHEST PORTABLE IMMED 1V   ORDERED BY: KWAN THOMAS     PROCEDURE DATE:  05/03/2023          INTERPRETATION:  AP chest on May 3, 2023 at 9:28 AM. 2 images. Patient   has sepsis and altered mental status.    Heart magnified by technique.    Lungs remain clear.    Chest is similar to August 28, 2012.    IMPRESSION: No acute finding or change.    Assessment :   76 y/o PMH dementia, HTN, Afib s/p wachsman 2020, GERD, CKD, anxiety, schizophrenia  admitted for AMS/agitation. Patient calm and cooperative at bedside. Mental status back to baseline  RVP + parainfluenza.   Urine culture with Proteus ESBL. . No fever WBC wnl.   Doubt UTI- likely asymptomatic bacteruria    Plan :   Defer antibiotics  Trend temps and cbc  Asp precautions  Stable from ID standpoint    Continue with present regiment .  Approptiate use of antibiotics and adverse effects reviewed.    > 45 minutes spent in direct patient care reviewing  the notes, lab data/ imaging , discussion with multidisciplinary team. All questions were addressed and answered to the best of my capacity .    Thank you for allowing me to participate in the care of your patient .      Fatoumata Miguel MD  Infectious Disease  [phone]

## 2023-05-08 NOTE — PROGRESS NOTE ADULT - SUBJECTIVE AND OBJECTIVE BOX
HILLARY RED is a 77yMale , patient examined and chart reviewed.     INTERVAL HPI/ OVERNIGHT EVENTS:   No events.  Afebrile. Confused.    PAST MEDICAL & SURGICAL HISTORY:  Prostate enlargement  Arthritis  HLD (hyperlipidemia)  Gout  h/o HTN (hypertension)  Obesity  H/o Cerebral aneurysm  HTN (hypertension)  Atrial fibrillation  Dementia  Schizophrenia  h/o Cerebral aneurysm coiling  2/18/2011 and 7/15/2011 [facility]  h/o Arthroscopy left knee 2004  h/o Arthroscopy right knee 2007  total knee arthroplasty left 3/5/2012  No significant past surgical history        For details regarding the patient's social history, family history, and other miscellaneous elements, please refer the initial infectious diseases consultation and/or the admitting history and physical examination for this admission.    ROS:  Unable to obtain due to : Dementia      No Known Allergies      Current inpatient medications :    ANTIBIOTICS/RELEVANT:    acetaminophen     Tablet .. 650 milliGRAM(s) Oral every 6 hours PRN  allopurinol 300 milliGRAM(s) Oral daily  ascorbic acid 500 milliGRAM(s) Oral two times a day  atorvastatin 10 milliGRAM(s) Oral at bedtime  bisacodyl Suppository 10 milliGRAM(s) Rectal daily PRN  colchicine 0.6 milliGRAM(s) Oral daily  famotidine    Tablet 20 milliGRAM(s) Oral daily  LORazepam     Tablet 0.5 milliGRAM(s) Oral two times a day PRN  magnesium hydroxide Suspension 30 milliLiter(s) Oral daily PRN  melatonin 5 milliGRAM(s) Oral at bedtime  memantine 10 milliGRAM(s) Oral daily  metoprolol succinate  milliGRAM(s) Oral daily  multivitamin 1 Tablet(s) Oral daily  nystatin Powder 1 Application(s) Topical two times a day  OLANZapine Injectable 2.5 milliGRAM(s) IntraMuscular every 8 hours PRN  risperiDONE   Tablet 1 milliGRAM(s) Oral three times a day  senna 1 Tablet(s) Oral at bedtime  sertraline 50 milliGRAM(s) Oral daily  tamsulosin 0.4 milliGRAM(s) Oral at bedtime      Objective:    05-07 @ 07:01  -  05-08 @ 07:00  --------------------------------------------------------  IN: 0 mL / OUT: 100 mL / NET: -100 mL      T(C): 36.3 (05-08-23 @ 13:32), Max: 36.4 (05-08-23 @ 06:47)  HR: 63 (05-08-23 @ 13:32) (63 - 76)  BP: 137/88 (05-08-23 @ 13:32) (124/74 - 137/88)  RR: 18 (05-08-23 @ 13:32) (18 - 18)  SpO2: 97% (05-08-23 @ 13:32) (96% - 97%)  Wt(kg): --      Physical Exam:  General: no acute distress  Neck: supple, trachea midline  Lungs: clear, no wheeze/rhonchi  Cardiovascular: regular rate and rhythm, S1 S2  Abdomen: soft, nontender,  bowel sounds normal  Neurological: awake confused  Skin: no rash  Extremities: no edema        LABS:                          9.0    5.90  )-----------( 206      ( 08 May 2023 12:54 )             29.0       05-08    142  |  116<H>  |  31<H>  ----------------------------<  81  3.8   |  21<L>  |  1.80<H>    Ca    8.3<L>      08 May 2023 05:26    TPro  5.4<L>  /  Alb  2.7<L>  /  TBili  0.5  /  DBili  x   /  AST  20  /  ALT  25  /  AlkPhos  63  05-07      Urinalysis (05.03.23 @ 10:40)    Glucose Qualitative, Urine: Negative   Blood, Urine: Large   pH Urine: 5.0   Color: Yellow   Urine Appearance: Clear   Bilirubin: Negative   Ketone - Urine: Negative   Specific Gravity: 1.010   Protein, Urine: 100   Urobilinogen: Negative   Nitrite: Negative   Leukocyte Esterase Concentration: Moderate   Red Blood Cell - Urine: 11-25 /HPF   White Blood Cell - Urine: 6-10   Bacteria: Occasional   Squamous Epithelial Cells: Occasional      MICROBIOLOGY:  Culture - Urine (05.03.23 @ 10:40)    -  Amikacin: S <=16   -  Amoxicillin/Clavulanic Acid: S <=8/4   -  Ampicillin: R >16 These ampicillin results predict results for amoxicillin   -  Ampicillin/Sulbactam: S <=4/2 Enterobacter, Klebsiella aerogenes, Citrobacter, and Serratia may develop resistance during prolonged therapy (3-4 days)   -  Aztreonam: R >16   -  Cefazolin: R >16 For uncomplicated UTI with K. pneumoniae, E. coli, or P. mirablis: STEPHEN <=16 is sensitive and STEPHEN >=32 is resistant. This also predicts results for oral agents cefaclor, cefdinir, cefpodoxime, cefprozil, cefuroxime axetil, cephalexin and locarbef for uncomplicated UTI. Note that some isolates may be susceptible to these agents while testing resistant to cefazolin.   -  Cefepime: R >16   -  Ceftriaxone: R >32 Enterobacter, Klebsiella aerogenes, Citrobacter, and Serratia may develop resistance during prolonged therapy   -  Cefuroxime: R >16   -  Ciprofloxacin: R >2   -  Ertapenem: S <=0.5   -  Gentamicin: R >8   -  Levofloxacin: R >4   -  Meropenem: S <=1   -  Nitrofurantoin: R >64 Should not be used to treat pyelonephritis   -  Piperacillin/Tazobactam: SDD 16   -  Tobramycin: R >8   -  Trimethoprim/Sulfamethoxazole: R >2/38   Specimen Source: Clean Catch Clean Catch (Midstream)   Culture Results:   10,000 - 49,000 CFU/mL Proteus mirabilis ESBL  10,000 - 49,000 CFU/mL Coag Negative Staphylococcus "Susceptibilities not  performed"   Organism Identification: Proteus mirabilis ESBL   Organism: Proteus mirabilis ESBL   Method Type: STEPHEN    Culture - Blood (05.03.23 @ 08:30)    Specimen Source: .Blood Blood-Peripheral   Culture Results:   No growth to date.    Respiratory Viral Panel with COVID-19 by JOSE (05.03.23 @ 08:30)    Rapid RVP Result: Detected   SARS-CoV-2: NotDete: This Respiratory Panel uses polymerase chain reaction (PCR) to detect for  adenovirus; coronavirus (HKU1, NL63, 229E, OC43); human metapneumovirus  (hMPV); human enterovirus/rhinovirus (Entero/RV); influenza A; influenza  A/H1; influenza A/H3; influenza A/H1-2009; influenza B; parainfluenza  viruses 1, 2, 3, 4; respiratory syncytial virus; Mycoplasma pneumoniae;  Chlamydophila pneumoniae; and SARS-CoV-2.   Parainfluenza 3 (RapRVP): Detected      RADIOLOGY & ADDITIONAL STUDIES:    ACC: [id number] EXAM:  XR CHEST PORTABLE IMMED 1V   ORDERED BY: KWAN THOMAS     PROCEDURE DATE:  05/03/2023          INTERPRETATION:  AP chest on May 3, 2023 at 9:28 AM. 2 images. Patient   has sepsis and altered mental status.    Heart magnified by technique.    Lungs remain clear.    Chest is similar to August 28, 2012.    IMPRESSION: No acute finding or change.    Assessment :   78 y/o PMH dementia, HTN, Afib s/p wachsman 2020, GERD, CKD, anxiety, schizophrenia  admitted for AMS/agitation. Mental status back to baseline- remains confused.  RVP + parainfluenza.   Urine culture with Proteus ESBL. . No fever WBC wnl.   Doubt UTI- likely asymptomatic bacteruria    Plan :   Monitor off antibiotics  Trend temps and cbc  Asp precautions  Stable from ID standpoint    D/w Dr Gordon    Continue with present regiment.  Appropriate use of antibiotics and adverse effects reviewed.    > 35 minutes were spent in direct patient care reviewing notes, medications ,labs data/ imaging , discussion with multidisciplinary team.    Thank you for allowing me to participate in care of your patient .    Fatoumata Miguel MD  Infectious Disease  [phone]

## 2023-05-08 NOTE — BH CONSULTATION LIAISON ASSESSMENT NOTE - HPI (INCLUDE ILLNESS QUALITY, SEVERITY, DURATION, TIMING, CONTEXT, MODIFYING FACTORS, ASSOCIATED SIGNS AND SYMPTOMS)
Patient seen, evaluated and chart reviewed. Patient is a 77-year-old white male with history of dementia hypertension atrial fibrillation anemia BPH hyperlipidemia GERD gout schizophrenia anxiety disorder as well as renal osteodystrophy sent to the emergency department here at [facility] today for evaluation of "altered mental status".  Per transfer papers from [facility] as well as from [facility] who transported patient here patient has been more aggressive in terms of behavior and they felt he may be in danger to himself and potentially others.  No additional history is available at this time secondary to patient's baseline dementia. Patient presented with confusion, internal preoccupation and inability to fully engage.

## 2023-05-09 LAB
ALBUMIN SERPL ELPH-MCNC: 2.7 G/DL — LOW (ref 3.3–5)
ALP SERPL-CCNC: 61 U/L — SIGNIFICANT CHANGE UP (ref 40–120)
ALT FLD-CCNC: 27 U/L — SIGNIFICANT CHANGE UP (ref 12–78)
ANION GAP SERPL CALC-SCNC: 3 MMOL/L — LOW (ref 5–17)
AST SERPL-CCNC: 21 U/L — SIGNIFICANT CHANGE UP (ref 15–37)
BASOPHILS # BLD AUTO: 0.04 K/UL — SIGNIFICANT CHANGE UP (ref 0–0.2)
BASOPHILS NFR BLD AUTO: 0.6 % — SIGNIFICANT CHANGE UP (ref 0–2)
BILIRUB SERPL-MCNC: 0.5 MG/DL — SIGNIFICANT CHANGE UP (ref 0.2–1.2)
BUN SERPL-MCNC: 28 MG/DL — HIGH (ref 7–23)
CALCIUM SERPL-MCNC: 8.4 MG/DL — LOW (ref 8.5–10.1)
CHLORIDE SERPL-SCNC: 116 MMOL/L — HIGH (ref 96–108)
CO2 SERPL-SCNC: 22 MMOL/L — SIGNIFICANT CHANGE UP (ref 22–31)
CREAT SERPL-MCNC: 1.7 MG/DL — HIGH (ref 0.5–1.3)
EGFR: 41 ML/MIN/1.73M2 — LOW
EOSINOPHIL # BLD AUTO: 0.26 K/UL — SIGNIFICANT CHANGE UP (ref 0–0.5)
EOSINOPHIL NFR BLD AUTO: 4.1 % — SIGNIFICANT CHANGE UP (ref 0–6)
GLUCOSE SERPL-MCNC: 100 MG/DL — HIGH (ref 70–99)
HCT VFR BLD CALC: 28.2 % — LOW (ref 39–50)
HGB BLD-MCNC: 8.9 G/DL — LOW (ref 13–17)
IMM GRANULOCYTES NFR BLD AUTO: 0.3 % — SIGNIFICANT CHANGE UP (ref 0–0.9)
LYMPHOCYTES # BLD AUTO: 1.36 K/UL — SIGNIFICANT CHANGE UP (ref 1–3.3)
LYMPHOCYTES # BLD AUTO: 21.3 % — SIGNIFICANT CHANGE UP (ref 13–44)
MCHC RBC-ENTMCNC: 29.5 PG — SIGNIFICANT CHANGE UP (ref 27–34)
MCHC RBC-ENTMCNC: 31.6 GM/DL — LOW (ref 32–36)
MCV RBC AUTO: 93.4 FL — SIGNIFICANT CHANGE UP (ref 80–100)
MONOCYTES # BLD AUTO: 0.46 K/UL — SIGNIFICANT CHANGE UP (ref 0–0.9)
MONOCYTES NFR BLD AUTO: 7.2 % — SIGNIFICANT CHANGE UP (ref 2–14)
NEUTROPHILS # BLD AUTO: 4.26 K/UL — SIGNIFICANT CHANGE UP (ref 1.8–7.4)
NEUTROPHILS NFR BLD AUTO: 66.5 % — SIGNIFICANT CHANGE UP (ref 43–77)
NRBC # BLD: 0 /100 WBCS — SIGNIFICANT CHANGE UP (ref 0–0)
PLATELET # BLD AUTO: 176 K/UL — SIGNIFICANT CHANGE UP (ref 150–400)
POTASSIUM SERPL-MCNC: 4 MMOL/L — SIGNIFICANT CHANGE UP (ref 3.5–5.3)
POTASSIUM SERPL-SCNC: 4 MMOL/L — SIGNIFICANT CHANGE UP (ref 3.5–5.3)
PROT SERPL-MCNC: 5.2 G/DL — LOW (ref 6–8.3)
RBC # BLD: 3.02 M/UL — LOW (ref 4.2–5.8)
RBC # FLD: 16.6 % — HIGH (ref 10.3–14.5)
SODIUM SERPL-SCNC: 141 MMOL/L — SIGNIFICANT CHANGE UP (ref 135–145)
WBC # BLD: 6.4 K/UL — SIGNIFICANT CHANGE UP (ref 3.8–10.5)
WBC # FLD AUTO: 6.4 K/UL — SIGNIFICANT CHANGE UP (ref 3.8–10.5)

## 2023-05-09 PROCEDURE — 99233 SBSQ HOSP IP/OBS HIGH 50: CPT

## 2023-05-09 PROCEDURE — 99231 SBSQ HOSP IP/OBS SF/LOW 25: CPT

## 2023-05-09 RX ORDER — QUETIAPINE FUMARATE 200 MG/1
50 TABLET, FILM COATED ORAL
Refills: 0 | Status: DISCONTINUED | OUTPATIENT
Start: 2023-05-09 | End: 2023-05-15

## 2023-05-09 RX ORDER — ERTAPENEM SODIUM 1 G/1
1000 INJECTION, POWDER, LYOPHILIZED, FOR SOLUTION INTRAMUSCULAR; INTRAVENOUS EVERY 24 HOURS
Refills: 0 | Status: COMPLETED | OUTPATIENT
Start: 2023-05-10 | End: 2023-05-14

## 2023-05-09 RX ORDER — QUETIAPINE FUMARATE 200 MG/1
100 TABLET, FILM COATED ORAL AT BEDTIME
Refills: 0 | Status: DISCONTINUED | OUTPATIENT
Start: 2023-05-09 | End: 2023-05-15

## 2023-05-09 RX ORDER — ERTAPENEM SODIUM 1 G/1
INJECTION, POWDER, LYOPHILIZED, FOR SOLUTION INTRAMUSCULAR; INTRAVENOUS
Refills: 0 | Status: COMPLETED | OUTPATIENT
Start: 2023-05-09 | End: 2023-05-14

## 2023-05-09 RX ORDER — ERTAPENEM SODIUM 1 G/1
1000 INJECTION, POWDER, LYOPHILIZED, FOR SOLUTION INTRAMUSCULAR; INTRAVENOUS ONCE
Refills: 0 | Status: COMPLETED | OUTPATIENT
Start: 2023-05-09 | End: 2023-05-09

## 2023-05-09 RX ADMIN — RISPERIDONE 1 MILLIGRAM(S): 4 TABLET ORAL at 05:07

## 2023-05-09 RX ADMIN — QUETIAPINE FUMARATE 100 MILLIGRAM(S): 200 TABLET, FILM COATED ORAL at 22:08

## 2023-05-09 RX ADMIN — QUETIAPINE FUMARATE 50 MILLIGRAM(S): 200 TABLET, FILM COATED ORAL at 16:08

## 2023-05-09 RX ADMIN — FAMOTIDINE 20 MILLIGRAM(S): 10 INJECTION INTRAVENOUS at 11:34

## 2023-05-09 RX ADMIN — TAMSULOSIN HYDROCHLORIDE 0.4 MILLIGRAM(S): 0.4 CAPSULE ORAL at 22:08

## 2023-05-09 RX ADMIN — NYSTATIN CREAM 1 APPLICATION(S): 100000 CREAM TOPICAL at 06:07

## 2023-05-09 RX ADMIN — Medication 0.5 MILLIGRAM(S): at 16:45

## 2023-05-09 RX ADMIN — Medication 500 MILLIGRAM(S): at 16:08

## 2023-05-09 RX ADMIN — ATORVASTATIN CALCIUM 10 MILLIGRAM(S): 80 TABLET, FILM COATED ORAL at 22:07

## 2023-05-09 RX ADMIN — Medication 100 MILLIGRAM(S): at 05:07

## 2023-05-09 RX ADMIN — Medication 5 MILLIGRAM(S): at 22:10

## 2023-05-09 RX ADMIN — MEMANTINE HYDROCHLORIDE 10 MILLIGRAM(S): 10 TABLET ORAL at 11:33

## 2023-05-09 RX ADMIN — Medication 300 MILLIGRAM(S): at 11:34

## 2023-05-09 RX ADMIN — SENNA PLUS 1 TABLET(S): 8.6 TABLET ORAL at 22:10

## 2023-05-09 RX ADMIN — SERTRALINE 50 MILLIGRAM(S): 25 TABLET, FILM COATED ORAL at 11:33

## 2023-05-09 RX ADMIN — Medication 0.6 MILLIGRAM(S): at 11:34

## 2023-05-09 RX ADMIN — ERTAPENEM SODIUM 120 MILLIGRAM(S): 1 INJECTION, POWDER, LYOPHILIZED, FOR SOLUTION INTRAMUSCULAR; INTRAVENOUS at 15:22

## 2023-05-09 RX ADMIN — NYSTATIN CREAM 1 APPLICATION(S): 100000 CREAM TOPICAL at 16:09

## 2023-05-09 RX ADMIN — Medication 1 TABLET(S): at 11:33

## 2023-05-09 RX ADMIN — Medication 500 MILLIGRAM(S): at 05:07

## 2023-05-09 RX ADMIN — Medication 0.5 MILLIGRAM(S): at 06:57

## 2023-05-09 RX ADMIN — OLANZAPINE 2.5 MILLIGRAM(S): 15 TABLET, FILM COATED ORAL at 04:34

## 2023-05-09 RX ADMIN — RISPERIDONE 1 MILLIGRAM(S): 4 TABLET ORAL at 11:33

## 2023-05-09 NOTE — PROGRESS NOTE ADULT - SUBJECTIVE AND OBJECTIVE BOX
Patient is a 77y old  Male who presents with a chief complaint of agitation, CKD, anemia (09 May 2023 11:07)    INTERVAL HPI/OVERNIGHT EVENTS: Patient was seen and examined. overnight events noted. on 1:1. very agitated overnight and did not improve with haldol only ativan    LABS:                        8.9    6.40  )-----------( 176      ( 09 May 2023 05:41 )             28.2     05-09    141  |  116<H>  |  28<H>  ----------------------------<  100<H>  4.0   |  22  |  1.70<H>    Ca    8.4<L>      09 May 2023 05:41    TPro  5.2<L>  /  Alb  2.7<L>  /  TBili  0.5  /  DBili  x   /  AST  21  /  ALT  27  /  AlkPhos  61  05-09    CAPILLARY BLOOD GLUCOSE    MEDICATIONS  (STANDING):  allopurinol 300 milliGRAM(s) Oral daily  ascorbic acid 500 milliGRAM(s) Oral two times a day  atorvastatin 10 milliGRAM(s) Oral at bedtime  colchicine 0.6 milliGRAM(s) Oral daily  epoetin jane-epbx (RETACRIT) Injectable 30850 Unit(s) SubCutaneous <User Schedule>  famotidine    Tablet 20 milliGRAM(s) Oral daily  melatonin 5 milliGRAM(s) Oral at bedtime  memantine 10 milliGRAM(s) Oral daily  metoprolol succinate  milliGRAM(s) Oral daily  multivitamin 1 Tablet(s) Oral daily  nystatin Powder 1 Application(s) Topical two times a day  risperiDONE   Tablet 1 milliGRAM(s) Oral three times a day  senna 1 Tablet(s) Oral at bedtime  sertraline 50 milliGRAM(s) Oral daily  tamsulosin 0.4 milliGRAM(s) Oral at bedtime    MEDICATIONS  (PRN):  acetaminophen     Tablet .. 650 milliGRAM(s) Oral every 6 hours PRN Mild Pain (1 - 3)  bisacodyl Suppository 10 milliGRAM(s) Rectal daily PRN Constipation  LORazepam     Tablet 0.5 milliGRAM(s) Oral two times a day PRN Agitation  magnesium hydroxide Suspension 30 milliLiter(s) Oral daily PRN Constipation      REVIEW OF SYSTEMS:  lethargy     RADIOLOGY & ADDITIONAL TESTS:    Imaging Personally Reviewed:  [x] YES  [ ] NO    Consultant(s) Notes Reviewed:  [x] YES  [ ] NO    PHYSICAL EXAM:  T(C): 37 (05-09-23 @ 05:00), Max: 37 (05-09-23 @ 05:00)  HR: 76 (05-09-23 @ 05:00) (63 - 76)  BP: 144/83 (05-09-23 @ 05:00) (137/88 - 159/85)  RR: 18 (05-09-23 @ 05:00) (18 - 18)  SpO2: 98% (05-09-23 @ 05:00) (97% - 100%)    GENERAL: NAD, well-developed, well-groomed  HEENT:  anicteric, moist mucous membranes  CHEST/LUNG:  CTA b/l, no rales, wheezes, or rhonchi  HEART:  RRR, S1, S2  ABDOMEN:  BS+, soft, nontender, nondistended  EXTREMITIES: no edema, cyanosis, or calf tenderness  NERVOUS SYSTEM: answers questions and follows commands appropriately  PSYCH: normal affect    Care Discussed with Consultants/Other Providers [x] YES  [ ] NO

## 2023-05-09 NOTE — PROGRESS NOTE ADULT - SUBJECTIVE AND OBJECTIVE BOX
Patient is a 77y old  Male who presents with a chief complaint of agitation, CKD, anemia (08 May 2023 14:44)    Patient seen in follow up for CKD 3.        PAST MEDICAL HISTORY:  Prostate enlargement    Arthritis    Arthritis    HLD (hyperlipidemia)    Gout    h/o HTN (hypertension)    Obesity    H/o Cerebral aneurysm    HTN (hypertension)    Atrial fibrillation    Dementia    Schizophrenia      MEDICATIONS  (STANDING):  allopurinol 300 milliGRAM(s) Oral daily  ascorbic acid 500 milliGRAM(s) Oral two times a day  atorvastatin 10 milliGRAM(s) Oral at bedtime  colchicine 0.6 milliGRAM(s) Oral daily  epoetin jane-epbx (RETACRIT) Injectable 35598 Unit(s) SubCutaneous <User Schedule>  famotidine    Tablet 20 milliGRAM(s) Oral daily  melatonin 5 milliGRAM(s) Oral at bedtime  memantine 10 milliGRAM(s) Oral daily  metoprolol succinate  milliGRAM(s) Oral daily  multivitamin 1 Tablet(s) Oral daily  nystatin Powder 1 Application(s) Topical two times a day  risperiDONE   Tablet 1 milliGRAM(s) Oral three times a day  senna 1 Tablet(s) Oral at bedtime  sertraline 50 milliGRAM(s) Oral daily  tamsulosin 0.4 milliGRAM(s) Oral at bedtime    MEDICATIONS  (PRN):  acetaminophen     Tablet .. 650 milliGRAM(s) Oral every 6 hours PRN Mild Pain (1 - 3)  bisacodyl Suppository 10 milliGRAM(s) Rectal daily PRN Constipation  LORazepam     Tablet 0.5 milliGRAM(s) Oral two times a day PRN Agitation  magnesium hydroxide Suspension 30 milliLiter(s) Oral daily PRN Constipation    T(C): 37 (05-09-23 @ 05:00), Max: 37 (05-09-23 @ 05:00)  HR: 76 (05-09-23 @ 05:00) (63 - 76)  BP: 144/83 (05-09-23 @ 05:00) (124/74 - 159/85)  RR: 18 (05-09-23 @ 05:00) (18 - 18)  SpO2: 98% (05-09-23 @ 05:00) (96% - 100%)  Wt(kg): --  I&O's Detail      PHYSICAL EXAM:  General: No distress  Respiratory: b/l air entry  Cardiovascular: S1 S2  Gastrointestinal: soft  Extremities:  + edema                              8.9    6.40  )-----------( 176      ( 09 May 2023 05:41 )             28.2     05-09    141  |  116<H>  |  28<H>  ----------------------------<  100<H>  4.0   |  22  |  1.70<H>    Ca    8.4<L>      09 May 2023 05:41    TPro  5.2<L>  /  Alb  2.7<L>  /  TBili  0.5  /  DBili  x   /  AST  21  /  ALT  27  /  AlkPhos  61  05-09        LIVER FUNCTIONS - ( 09 May 2023 05:41 )  Alb: 2.7 g/dL / Pro: 5.2 g/dL / ALK PHOS: 61 U/L / ALT: 27 U/L / AST: 21 U/L / GGT: x               Sodium, Serum: 141 (05-09 @ 05:41)  Sodium, Serum: 142 (05-08 @ 05:26)  Sodium, Serum: 141 (05-07 @ 08:54)  Sodium, Serum: 141 (05-06 @ 05:10)    Creatinine, Serum: 1.70 (05-09 @ 05:41)  Creatinine, Serum: 1.80 (05-08 @ 05:26)  Creatinine, Serum: 1.80 (05-07 @ 08:54)  Creatinine, Serum: 1.80 (05-06 @ 05:10)    Potassium, Serum: 4.0 (05-09 @ 05:41)  Potassium, Serum: 3.8 (05-08 @ 05:26)  Potassium, Serum: 3.7 (05-07 @ 08:54)  Potassium, Serum: 3.7 (05-06 @ 05:10)    Hemoglobin: 8.9 (05-09 @ 05:41)  Hemoglobin: 9.0 (05-08 @ 12:54)  Hemoglobin: 7.5 (05-08 @ 05:26)  Hemoglobin: 8.5 (05-07 @ 08:54)

## 2023-05-09 NOTE — PROGRESS NOTE ADULT - SUBJECTIVE AND OBJECTIVE BOX
HILLARY RED is a 77yMale , patient examined and chart reviewed.     INTERVAL HPI/ OVERNIGHT EVENTS:   Events noted - more confused and agitated.  SP Haldol.  Afebrile.     PAST MEDICAL & SURGICAL HISTORY:  Prostate enlargement  Arthritis  HLD (hyperlipidemia)  Gout  h/o HTN (hypertension)  Obesity  H/o Cerebral aneurysm  HTN (hypertension)  Atrial fibrillation  Dementia  Schizophrenia  h/o Cerebral aneurysm coiling  2/18/2011 and 7/15/2011 [facility]  h/o Arthroscopy left knee 2004  h/o Arthroscopy right knee 2007  total knee arthroplasty left 3/5/2012  No significant past surgical history        For details regarding the patient's social history, family history, and other miscellaneous elements, please refer the initial infectious diseases consultation and/or the admitting history and physical examination for this admission.    ROS:  Unable to obtain due to : Dementia      No Known Allergies      Current inpatient medications :    ANTIBIOTICS/RELEVANT:    MEDICATIONS  (STANDING):  allopurinol 300 milliGRAM(s) Oral daily  ascorbic acid 500 milliGRAM(s) Oral two times a day  atorvastatin 10 milliGRAM(s) Oral at bedtime  colchicine 0.6 milliGRAM(s) Oral daily  epoetin jane-epbx (RETACRIT) Injectable 72646 Unit(s) SubCutaneous <User Schedule>  famotidine    Tablet 20 milliGRAM(s) Oral daily  melatonin 5 milliGRAM(s) Oral at bedtime  memantine 10 milliGRAM(s) Oral daily  metoprolol succinate  milliGRAM(s) Oral daily  multivitamin 1 Tablet(s) Oral daily  nystatin Powder 1 Application(s) Topical two times a day  QUEtiapine 100 milliGRAM(s) Oral at bedtime  QUEtiapine 50 milliGRAM(s) Oral two times a day  senna 1 Tablet(s) Oral at bedtime  sertraline 50 milliGRAM(s) Oral daily  tamsulosin 0.4 milliGRAM(s) Oral at bedtime    MEDICATIONS  (PRN):  acetaminophen     Tablet .. 650 milliGRAM(s) Oral every 6 hours PRN Mild Pain (1 - 3)  bisacodyl Suppository 10 milliGRAM(s) Rectal daily PRN Constipation  LORazepam     Tablet 0.5 milliGRAM(s) Oral two times a day PRN Agitation  magnesium hydroxide Suspension 30 milliLiter(s) Oral daily PRN Constipation      Objective:  Vital Signs Last 24 Hrs  T(C): 36.3 (09 May 2023 13:23), Max: 37 (09 May 2023 05:00)  T(F): 97.4 (09 May 2023 13:23), Max: 98.6 (09 May 2023 05:00)  HR: 72 (09 May 2023 13:23) (68 - 76)  BP: 140/78 (09 May 2023 13:23) (140/78 - 159/85)  RR: 18 (09 May 2023 13:23) (18 - 18)  SpO2: 96% (09 May 2023 13:23) (96% - 100%)    Parameters below as of 09 May 2023 13:23  Patient On (Oxygen Delivery Method): room air      Physical Exam:  General: Confused agitated  Neck: supple, trachea midline  Lungs: clear, no wheeze/rhonchi  Cardiovascular: regular rate and rhythm, S1 S2  Abdomen: soft, nontender,  bowel sounds normal  Neurological: awake confused  Skin: no rash  Extremities: no edema        LABS:                        8.9    6.40  )-----------( 176      ( 09 May 2023 05:41 )             28.2   05-09    141  |  116<H>  |  28<H>  ----------------------------<  100<H>  4.0   |  22  |  1.70<H>    Ca    8.4<L>      09 May 2023 05:41    TPro  5.2<L>  /  Alb  2.7<L>  /  TBili  0.5  /  DBili  x   /  AST  21  /  ALT  27  /  AlkPhos  61  05-09      Urinalysis (05.03.23 @ 10:40)    Glucose Qualitative, Urine: Negative   Blood, Urine: Large   pH Urine: 5.0   Color: Yellow   Urine Appearance: Clear   Bilirubin: Negative   Ketone - Urine: Negative   Specific Gravity: 1.010   Protein, Urine: 100   Urobilinogen: Negative   Nitrite: Negative   Leukocyte Esterase Concentration: Moderate   Red Blood Cell - Urine: 11-25 /HPF   White Blood Cell - Urine: 6-10   Bacteria: Occasional   Squamous Epithelial Cells: Occasional      MICROBIOLOGY:  Culture - Urine (05.03.23 @ 10:40)    -  Amikacin: S <=16   -  Amoxicillin/Clavulanic Acid: S <=8/4   -  Ampicillin: R >16 These ampicillin results predict results for amoxicillin   -  Ampicillin/Sulbactam: S <=4/2 Enterobacter, Klebsiella aerogenes, Citrobacter, and Serratia may develop resistance during prolonged therapy (3-4 days)   -  Aztreonam: R >16   -  Cefazolin: R >16 For uncomplicated UTI with K. pneumoniae, E. coli, or P. mirablis: STEPHEN <=16 is sensitive and STEPHEN >=32 is resistant. This also predicts results for oral agents cefaclor, cefdinir, cefpodoxime, cefprozil, cefuroxime axetil, cephalexin and locarbef for uncomplicated UTI. Note that some isolates may be susceptible to these agents while testing resistant to cefazolin.   -  Cefepime: R >16   -  Ceftriaxone: R >32 Enterobacter, Klebsiella aerogenes, Citrobacter, and Serratia may develop resistance during prolonged therapy   -  Cefuroxime: R >16   -  Ciprofloxacin: R >2   -  Ertapenem: S <=0.5   -  Gentamicin: R >8   -  Levofloxacin: R >4   -  Meropenem: S <=1   -  Nitrofurantoin: R >64 Should not be used to treat pyelonephritis   -  Piperacillin/Tazobactam: SDD 16   -  Tobramycin: R >8   -  Trimethoprim/Sulfamethoxazole: R >2/38   Specimen Source: Clean Catch Clean Catch (Midstream)   Culture Results:   10,000 - 49,000 CFU/mL Proteus mirabilis ESBL  10,000 - 49,000 CFU/mL Coag Negative Staphylococcus "Susceptibilities not  performed"   Organism Identification: Proteus mirabilis ESBL   Organism: Proteus mirabilis ESBL   Method Type: STEPHEN    Culture - Blood (05.03.23 @ 08:30)    Specimen Source: .Blood Blood-Peripheral   Culture Results:   No growth to date.    Respiratory Viral Panel with COVID-19 by JOSE (05.03.23 @ 08:30)    Rapid RVP Result: Detected   SARS-CoV-2: NotDetec: This Respiratory Panel uses polymerase chain reaction (PCR) to detect for  adenovirus; coronavirus (HKU1, NL63, 229E, OC43); human metapneumovirus  (hMPV); human enterovirus/rhinovirus (Entero/RV); influenza A; influenza  A/H1; influenza A/H3; influenza A/H1-2009; influenza B; parainfluenza  viruses 1, 2, 3, 4; respiratory syncytial virus; Mycoplasma pneumoniae;  Chlamydophila pneumoniae; and SARS-CoV-2.   Parainfluenza 3 (RapRVP): Detected      RADIOLOGY & ADDITIONAL STUDIES:    ACC: [id number] EXAM:  XR CHEST PORTABLE IMMED 1V   ORDERED BY: KWAN THOMAS     PROCEDURE DATE:  05/03/2023          INTERPRETATION:  AP chest on May 3, 2023 at 9:28 AM. 2 images. Patient   has sepsis and altered mental status.    Heart magnified by technique.    Lungs remain clear.    Chest is similar to August 28, 2012.    IMPRESSION: No acute finding or change.    Assessment :   78 y/o PMH dementia, HTN, Afib s/p wachsman 2020, GERD, CKD, anxiety, schizophrenia  admitted for AMS/agitation. Mental status back to baseline- remains confused.  RVP + parainfluenza.   Urine culture with Proteus ESBL. . No fever WBC wnl.   Worsening confusion and agitation ? sec UTI    Plan :   Trial of Ertapenam   Trend temps and cbc  Asp precautions  Stable from ID standpoint    D/w Dr Gordon    Continue with present regiment.  Appropriate use of antibiotics and adverse effects reviewed.    > 35 minutes were spent in direct patient care reviewing notes, medications ,labs data/ imaging , discussion with multidisciplinary team.    Thank you for allowing me to participate in care of your patient .    Fatoumata Miguel MD  Infectious Disease  [phone]

## 2023-05-09 NOTE — PROVIDER CONTACT NOTE (OTHER) - SITUATION
pt increasingly agitated & aggressive attempting to hit staff. Pt  got OOB trying to leave to room. Difficulty re-directing pt.
patient c/o right groin pain. tylenol given and hot pack
Patient confused agitated restless and is at risk for harm due to multiple attempts to climb out of bed. attempt to reoriented and all fall risk interventions utilized.

## 2023-05-09 NOTE — PROGRESS NOTE ADULT - ASSESSMENT
CKD 3, Prerenal azotemia  Anemia  Dementia  Hypertension    05/08/23: Renal indices improved. Likely at baseline. To continue current meds. Monitor h/h trend. Transfuse PRBC's PRN. Check iron sat.   Monitor BP trend. Titrate BP meds as needed. Will follow electrolytes and renal function trend. Encourage PO intake as tolerated.  05/09/23: Stable renal indices. Started on retacrit. To continue current meds.
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
## 2023-05-09 NOTE — PROVIDER CONTACT NOTE (OTHER) - ACTION/TREATMENT ORDERED:
Dr. Gordon aware. will place patient on a 1:1 for safety and will assess medication regime and ECG results for proper medication for agitation. will continue to assess and monitor
MD will order ativan 0.5mg IV push x 1 for pt
Dr. Gordon aware. will continue to assess and monitor

## 2023-05-10 LAB
ANION GAP SERPL CALC-SCNC: 5 MMOL/L — SIGNIFICANT CHANGE UP (ref 5–17)
BUN SERPL-MCNC: 25 MG/DL — HIGH (ref 7–23)
CALCIUM SERPL-MCNC: 8.5 MG/DL — SIGNIFICANT CHANGE UP (ref 8.5–10.1)
CHLORIDE SERPL-SCNC: 117 MMOL/L — HIGH (ref 96–108)
CO2 SERPL-SCNC: 23 MMOL/L — SIGNIFICANT CHANGE UP (ref 22–31)
CREAT SERPL-MCNC: 1.6 MG/DL — HIGH (ref 0.5–1.3)
EGFR: 44 ML/MIN/1.73M2 — LOW
GLUCOSE SERPL-MCNC: 94 MG/DL — SIGNIFICANT CHANGE UP (ref 70–99)
HCT VFR BLD CALC: 30.3 % — LOW (ref 39–50)
HGB BLD-MCNC: 9.3 G/DL — LOW (ref 13–17)
IRON SATN MFR SERPL: 11 % — LOW (ref 16–55)
IRON SATN MFR SERPL: 28 UG/DL — LOW (ref 45–165)
MCHC RBC-ENTMCNC: 28.8 PG — SIGNIFICANT CHANGE UP (ref 27–34)
MCHC RBC-ENTMCNC: 30.7 GM/DL — LOW (ref 32–36)
MCV RBC AUTO: 93.8 FL — SIGNIFICANT CHANGE UP (ref 80–100)
NRBC # BLD: 0 /100 WBCS — SIGNIFICANT CHANGE UP (ref 0–0)
PLATELET # BLD AUTO: 189 K/UL — SIGNIFICANT CHANGE UP (ref 150–400)
POTASSIUM SERPL-MCNC: 4 MMOL/L — SIGNIFICANT CHANGE UP (ref 3.5–5.3)
POTASSIUM SERPL-SCNC: 4 MMOL/L — SIGNIFICANT CHANGE UP (ref 3.5–5.3)
RBC # BLD: 3.23 M/UL — LOW (ref 4.2–5.8)
RBC # FLD: 16.8 % — HIGH (ref 10.3–14.5)
SODIUM SERPL-SCNC: 145 MMOL/L — SIGNIFICANT CHANGE UP (ref 135–145)
TIBC SERPL-MCNC: 246 UG/DL — SIGNIFICANT CHANGE UP (ref 220–430)
UIBC SERPL-MCNC: 218 UG/DL — SIGNIFICANT CHANGE UP (ref 110–370)
WBC # BLD: 7.12 K/UL — SIGNIFICANT CHANGE UP (ref 3.8–10.5)
WBC # FLD AUTO: 7.12 K/UL — SIGNIFICANT CHANGE UP (ref 3.8–10.5)

## 2023-05-10 PROCEDURE — 99233 SBSQ HOSP IP/OBS HIGH 50: CPT

## 2023-05-10 PROCEDURE — 99231 SBSQ HOSP IP/OBS SF/LOW 25: CPT

## 2023-05-10 RX ADMIN — SENNA PLUS 1 TABLET(S): 8.6 TABLET ORAL at 22:59

## 2023-05-10 RX ADMIN — TAMSULOSIN HYDROCHLORIDE 0.4 MILLIGRAM(S): 0.4 CAPSULE ORAL at 22:58

## 2023-05-10 RX ADMIN — QUETIAPINE FUMARATE 50 MILLIGRAM(S): 200 TABLET, FILM COATED ORAL at 05:56

## 2023-05-10 RX ADMIN — ATORVASTATIN CALCIUM 10 MILLIGRAM(S): 80 TABLET, FILM COATED ORAL at 22:58

## 2023-05-10 RX ADMIN — FAMOTIDINE 20 MILLIGRAM(S): 10 INJECTION INTRAVENOUS at 11:28

## 2023-05-10 RX ADMIN — Medication 5 MILLIGRAM(S): at 22:59

## 2023-05-10 RX ADMIN — Medication 0.5 MILLIGRAM(S): at 20:02

## 2023-05-10 RX ADMIN — SERTRALINE 50 MILLIGRAM(S): 25 TABLET, FILM COATED ORAL at 11:17

## 2023-05-10 RX ADMIN — Medication 1 TABLET(S): at 11:17

## 2023-05-10 RX ADMIN — QUETIAPINE FUMARATE 100 MILLIGRAM(S): 200 TABLET, FILM COATED ORAL at 22:58

## 2023-05-10 RX ADMIN — MEMANTINE HYDROCHLORIDE 10 MILLIGRAM(S): 10 TABLET ORAL at 11:18

## 2023-05-10 RX ADMIN — Medication 500 MILLIGRAM(S): at 05:56

## 2023-05-10 RX ADMIN — Medication 0.5 MILLIGRAM(S): at 03:57

## 2023-05-10 RX ADMIN — ERTAPENEM SODIUM 120 MILLIGRAM(S): 1 INJECTION, POWDER, LYOPHILIZED, FOR SOLUTION INTRAMUSCULAR; INTRAVENOUS at 11:54

## 2023-05-10 RX ADMIN — NYSTATIN CREAM 1 APPLICATION(S): 100000 CREAM TOPICAL at 05:54

## 2023-05-10 RX ADMIN — QUETIAPINE FUMARATE 50 MILLIGRAM(S): 200 TABLET, FILM COATED ORAL at 16:48

## 2023-05-10 RX ADMIN — Medication 500 MILLIGRAM(S): at 16:48

## 2023-05-10 RX ADMIN — Medication 100 MILLIGRAM(S): at 05:56

## 2023-05-10 RX ADMIN — Medication 0.6 MILLIGRAM(S): at 11:17

## 2023-05-10 RX ADMIN — NYSTATIN CREAM 1 APPLICATION(S): 100000 CREAM TOPICAL at 16:48

## 2023-05-10 RX ADMIN — Medication 300 MILLIGRAM(S): at 11:17

## 2023-05-10 NOTE — BH CONSULTATION LIAISON PROGRESS NOTE - NSBHFUPINTERVALHXFT_PSY_A_CORE
Patient seen, evaluated and chart reviewed. Patient has been less agitated and confused. He appears to be generally calmer and more cooperative, although he gets agitated when changed.
Patient seen, evaluated and chart reviewed. Patient has been increasingly agitated and confused. The change in pharmacotherapy regimen has not made a difference in his behavior, in fact as per nursing staff he has worsened.

## 2023-05-10 NOTE — BH CONSULTATION LIAISON PROGRESS NOTE - NSBHATTESTBILLING_PSY_A_CORE
45534-Owhaxfcvwp OBS or IP - low complexity OR 25-34 mins
09291-Dhftkbsrwn OBS or IP - low complexity OR 25-34 mins

## 2023-05-10 NOTE — BH CONSULTATION LIAISON PROGRESS NOTE - MSE UNSTRUCTURED FT
Patient is confused and has difficulty fully engaging.
Patient is confused and has difficulty fully engaging.

## 2023-05-10 NOTE — BH CONSULTATION LIAISON PROGRESS NOTE - NSBHCHARTREVIEWLAB_PSY_A_CORE FT
9.3    7.12  )-----------( 189      ( 10 May 2023 05:54 )             30.3   05-10    145  |  117<H>  |  25<H>  ----------------------------<  94  4.0   |  23  |  1.60<H>    Ca    8.5      10 May 2023 05:54    TPro  5.2<L>  /  Alb  2.7<L>  /  TBili  0.5  /  DBili  x   /  AST  21  /  ALT  27  /  AlkPhos  61  05-09  
                      8.9    6.40  )-----------( 176      ( 09 May 2023 05:41 )             28.2   05-09    141  |  116<H>  |  28<H>  ----------------------------<  100<H>  4.0   |  22  |  1.70<H>    Ca    8.4<L>      09 May 2023 05:41    TPro  5.2<L>  /  Alb  2.7<L>  /  TBili  0.5  /  DBili  x   /  AST  21  /  ALT  27  /  AlkPhos  61  05-09

## 2023-05-10 NOTE — BH CONSULTATION LIAISON PROGRESS NOTE - NSBHCHARTREVIEWVS_PSY_A_CORE FT
Vital Signs Last 24 Hrs  T(C): 36.3 (09 May 2023 13:23), Max: 37 (09 May 2023 05:00)  T(F): 97.4 (09 May 2023 13:23), Max: 98.6 (09 May 2023 05:00)  HR: 72 (09 May 2023 13:23) (68 - 76)  BP: 140/78 (09 May 2023 13:23) (140/78 - 159/85)  BP(mean): --  RR: 18 (09 May 2023 13:23) (18 - 18)  SpO2: 96% (09 May 2023 13:23) (96% - 100%)    Parameters below as of 09 May 2023 13:23  Patient On (Oxygen Delivery Method): room air    
Vital Signs Last 24 Hrs  T(C): 36.6 (10 May 2023 12:30), Max: 37 (09 May 2023 21:17)  T(F): 97.8 (10 May 2023 12:30), Max: 98.6 (09 May 2023 21:17)  HR: 80 (10 May 2023 12:30) (70 - 80)  BP: 126/73 (10 May 2023 12:30) (126/73 - 161/87)  BP(mean): --  RR: 18 (10 May 2023 12:30) (18 - 18)  SpO2: 93% (10 May 2023 12:30) (93% - 94%)    Parameters below as of 10 May 2023 12:30  Patient On (Oxygen Delivery Method): room air

## 2023-05-10 NOTE — PROGRESS NOTE ADULT - SUBJECTIVE AND OBJECTIVE BOX
SAMMIEHILLARY MADRIGAL is a 77yMale , patient examined and chart reviewed.     INTERVAL HPI/ OVERNIGHT EVENTS:   Remains agitated.  Afebrile.     PAST MEDICAL & SURGICAL HISTORY:  Prostate enlargement  Arthritis  HLD (hyperlipidemia)  Gout  h/o HTN (hypertension)  Obesity  H/o Cerebral aneurysm  HTN (hypertension)  Atrial fibrillation  Dementia  Schizophrenia  h/o Cerebral aneurysm coiling  2/18/2011 and 7/15/2011 [facility]  h/o Arthroscopy left knee 2004  h/o Arthroscopy right knee 2007  total knee arthroplasty left 3/5/2012  No significant past surgical history        For details regarding the patient's social history, family history, and other miscellaneous elements, please refer the initial infectious diseases consultation and/or the admitting history and physical examination for this admission.    ROS:  Unable to obtain due to : Dementia      No Known Allergies      Current inpatient medications :    ANTIBIOTICS/RELEVANT:  ertapenem  IVPB 1000 milliGRAM(s) IV Intermittent every 24 hours    MEDICATIONS  (STANDING):  allopurinol 300 milliGRAM(s) Oral daily  ascorbic acid 500 milliGRAM(s) Oral two times a day  atorvastatin 10 milliGRAM(s) Oral at bedtime  colchicine 0.6 milliGRAM(s) Oral daily  epoetin jane-epbx (RETACRIT) Injectable 60901 Unit(s) SubCutaneous <User Schedule>  famotidine    Tablet 20 milliGRAM(s) Oral daily  melatonin 5 milliGRAM(s) Oral at bedtime  memantine 10 milliGRAM(s) Oral daily  metoprolol succinate  milliGRAM(s) Oral daily  multivitamin 1 Tablet(s) Oral daily  nystatin Powder 1 Application(s) Topical two times a day  QUEtiapine 100 milliGRAM(s) Oral at bedtime  QUEtiapine 50 milliGRAM(s) Oral two times a day  senna 1 Tablet(s) Oral at bedtime  sertraline 50 milliGRAM(s) Oral daily  tamsulosin 0.4 milliGRAM(s) Oral at bedtime    MEDICATIONS  (PRN):  acetaminophen     Tablet .. 650 milliGRAM(s) Oral every 6 hours PRN Mild Pain (1 - 3)  bisacodyl Suppository 10 milliGRAM(s) Rectal daily PRN Constipation  LORazepam     Tablet 0.5 milliGRAM(s) Oral two times a day PRN Agitation  magnesium hydroxide Suspension 30 milliLiter(s) Oral daily PRN Constipation        Objective:  Vital Signs Last 24 Hrs  T(C): 36.6 (10 May 2023 12:30), Max: 37 (09 May 2023 21:17)  T(F): 97.8 (10 May 2023 12:30), Max: 98.6 (09 May 2023 21:17)  HR: 80 (10 May 2023 12:30) (70 - 80)  BP: 126/73 (10 May 2023 12:30) (126/73 - 161/87)  RR: 18 (10 May 2023 12:30) (18 - 18)  SpO2: 93% (10 May 2023 12:30) (93% - 94%)    Parameters below as of 10 May 2023 12:30  Patient On (Oxygen Delivery Method): room air      Physical Exam:  General: NAD  Neck: supple, trachea midline  Lungs: clear, no wheeze/rhonchi  Cardiovascular: regular rate and rhythm, S1 S2  Abdomen: soft, nontender,  bowel sounds normal  Neurological: Confused  Skin: no rash  Extremities: no edema        LABS:                        9.3    7.12  )-----------( 189      ( 10 May 2023 05:54 )             30.3   05-10    145  |  117<H>  |  25<H>  ----------------------------<  94  4.0   |  23  |  1.60<H>    Ca    8.5      10 May 2023 05:54    TPro  5.2<L>  /  Alb  2.7<L>  /  TBili  0.5  /  DBili  x   /  AST  21  /  ALT  27  /  AlkPhos  61  05-09    Urinalysis (05.03.23 @ 10:40)    Glucose Qualitative, Urine: Negative   Blood, Urine: Large   pH Urine: 5.0   Color: Yellow   Urine Appearance: Clear   Bilirubin: Negative   Ketone - Urine: Negative   Specific Gravity: 1.010   Protein, Urine: 100   Urobilinogen: Negative   Nitrite: Negative   Leukocyte Esterase Concentration: Moderate   Red Blood Cell - Urine: 11-25 /HPF   White Blood Cell - Urine: 6-10   Bacteria: Occasional   Squamous Epithelial Cells: Occasional      MICROBIOLOGY:  Culture - Urine (05.03.23 @ 10:40)    -  Amikacin: S <=16   -  Amoxicillin/Clavulanic Acid: S <=8/4   -  Ampicillin: R >16 These ampicillin results predict results for amoxicillin   -  Ampicillin/Sulbactam: S <=4/2 Enterobacter, Klebsiella aerogenes, Citrobacter, and Serratia may develop resistance during prolonged therapy (3-4 days)   -  Aztreonam: R >16   -  Cefazolin: R >16 For uncomplicated UTI with K. pneumoniae, E. coli, or P. mirablis: STEPHEN <=16 is sensitive and STEPHEN >=32 is resistant. This also predicts results for oral agents cefaclor, cefdinir, cefpodoxime, cefprozil, cefuroxime axetil, cephalexin and locarbef for uncomplicated UTI. Note that some isolates may be susceptible to these agents while testing resistant to cefazolin.   -  Cefepime: R >16   -  Ceftriaxone: R >32 Enterobacter, Klebsiella aerogenes, Citrobacter, and Serratia may develop resistance during prolonged therapy   -  Cefuroxime: R >16   -  Ciprofloxacin: R >2   -  Ertapenem: S <=0.5   -  Gentamicin: R >8   -  Levofloxacin: R >4   -  Meropenem: S <=1   -  Nitrofurantoin: R >64 Should not be used to treat pyelonephritis   -  Piperacillin/Tazobactam: SDD 16   -  Tobramycin: R >8   -  Trimethoprim/Sulfamethoxazole: R >2/38   Specimen Source: Clean Catch Clean Catch (Midstream)   Culture Results:   10,000 - 49,000 CFU/mL Proteus mirabilis ESBL  10,000 - 49,000 CFU/mL Coag Negative Staphylococcus "Susceptibilities not  performed"   Organism Identification: Proteus mirabilis ESBL   Organism: Proteus mirabilis ESBL   Method Type: STEPHEN    Culture - Blood (05.03.23 @ 08:30)    Specimen Source: .Blood Blood-Peripheral   Culture Results:   No growth to date.    Respiratory Viral Panel with COVID-19 by JOSE (05.03.23 @ 08:30)    Rapid RVP Result: Detected   SARS-CoV-2: NotDete: This Respiratory Panel uses polymerase chain reaction (PCR) to detect for  adenovirus; coronavirus (HKU1, NL63, 229E, OC43); human metapneumovirus  (hMPV); human enterovirus/rhinovirus (Entero/RV); influenza A; influenza  A/H1; influenza A/H3; influenza A/H1-2009; influenza B; parainfluenza  viruses 1, 2, 3, 4; respiratory syncytial virus; Mycoplasma pneumoniae;  Chlamydophila pneumoniae; and SARS-CoV-2.   Parainfluenza 3 (RapRVP): Detected      RADIOLOGY & ADDITIONAL STUDIES:    ACC: [id number] EXAM:  XR CHEST PORTABLE IMMED 1V   ORDERED BY: KWAN THOMAS     PROCEDURE DATE:  05/03/2023          INTERPRETATION:  AP chest on May 3, 2023 at 9:28 AM. 2 images. Patient   has sepsis and altered mental status.    Heart magnified by technique.    Lungs remain clear.    Chest is similar to August 28, 2012.    IMPRESSION: No acute finding or change.    Assessment :   78 y/o PMH dementia, HTN, Afib s/p wachsman 2020, GERD, CKD, anxiety, schizophrenia  admitted for AMS/agitation. Mental status back to baseline- remains confused.  RVP + parainfluenza.   Urine culture with Proteus ESBL. . No fever WBC wnl.   Worsening confusion and agitation ? sec UTI    Plan :   Cont Ertapenam   Trend temps and cbc  Asp precautions  Stable from ID standpoint      Continue with present regiment.  Appropriate use of antibiotics and adverse effects reviewed.    > 35 minutes were spent in direct patient care reviewing notes, medications ,labs data/ imaging , discussion with multidisciplinary team.    Thank you for allowing me to participate in care of your patient .    Fatoumata Miguel MD  Infectious Disease  [phone]

## 2023-05-10 NOTE — SOCIAL WORK PROGRESS NOTE - NSSWPROGRESSNOTE_GEN_ALL_CORE
Pt remains under treatment. Per emr, pt remains on 1:1 obs. Sw to follow for anticipated plan/needs. Anticipate ret to [facility]. Pt would need to be off 1:1 obs.
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
## 2023-05-10 NOTE — PROGRESS NOTE ADULT - SUBJECTIVE AND OBJECTIVE BOX
Patient is a 77y old  Male who presents with a chief complaint of agitation, CKD, anemia (09 May 2023 14:47)       INTERVAL HPI/OVERNIGHT EVENTS: Patient seen and examined at bedside. Intermittent agitation, on 1:1 observation     MEDICATIONS  (STANDING):  allopurinol 300 milliGRAM(s) Oral daily  ascorbic acid 500 milliGRAM(s) Oral two times a day  atorvastatin 10 milliGRAM(s) Oral at bedtime  colchicine 0.6 milliGRAM(s) Oral daily  epoetin jane-epbx (RETACRIT) Injectable 74715 Unit(s) SubCutaneous <User Schedule>  ertapenem  IVPB      ertapenem  IVPB 1000 milliGRAM(s) IV Intermittent every 24 hours  famotidine    Tablet 20 milliGRAM(s) Oral daily  melatonin 5 milliGRAM(s) Oral at bedtime  memantine 10 milliGRAM(s) Oral daily  metoprolol succinate  milliGRAM(s) Oral daily  multivitamin 1 Tablet(s) Oral daily  nystatin Powder 1 Application(s) Topical two times a day  QUEtiapine 50 milliGRAM(s) Oral two times a day  QUEtiapine 100 milliGRAM(s) Oral at bedtime  senna 1 Tablet(s) Oral at bedtime  sertraline 50 milliGRAM(s) Oral daily  tamsulosin 0.4 milliGRAM(s) Oral at bedtime    MEDICATIONS  (PRN):  acetaminophen     Tablet .. 650 milliGRAM(s) Oral every 6 hours PRN Mild Pain (1 - 3)  bisacodyl Suppository 10 milliGRAM(s) Rectal daily PRN Constipation  LORazepam     Tablet 0.5 milliGRAM(s) Oral two times a day PRN Agitation  magnesium hydroxide Suspension 30 milliLiter(s) Oral daily PRN Constipation      Allergies    No Known Allergies    Intolerances        REVIEW OF SYSTEMS:  Unable to obtain due to confusion   Vital Signs Last 24 Hrs  T(C): 36.4 (10 May 2023 04:45), Max: 37 (09 May 2023 21:17)  T(F): 97.6 (10 May 2023 04:45), Max: 98.6 (09 May 2023 21:17)  HR: 70 (10 May 2023 04:45) (70 - 72)  BP: 127/72 (10 May 2023 04:45) (127/72 - 161/87)  BP(mean): --  RR: 18 (10 May 2023 04:45) (18 - 18)  SpO2: 94% (10 May 2023 04:45) (94% - 96%)    Parameters below as of 10 May 2023 04:45  Patient On (Oxygen Delivery Method): room air        PHYSICAL EXAM:  GENERAL: NAD, well-developed, well-groomed  HEENT:  anicteric, moist mucous membranes  CHEST/LUNG:  CTA b/l, no rales, wheezes, or rhonchi  HEART:  RRR, S1, S2  ABDOMEN:  BS+, soft, nontender, nondistended  EXTREMITIES: no edema, cyanosis, or calf tenderness  NERVOUS SYSTEM: answers questions and follows commands appropriately  PSYCH: normal affect  SKIN: No rashes or lesions    LABS:                        9.3    7.12  )-----------( 189      ( 10 May 2023 05:54 )             30.3     10 May 2023 05:54    145    |  117    |  25     ----------------------------<  94     4.0     |  23     |  1.60     Ca    8.5        10 May 2023 05:54        CAPILLARY BLOOD GLUCOSE        BLOOD CULTURE    RADIOLOGY & ADDITIONAL TESTS:    Imaging Personally Reviewed:  [ ] YES     Consultant(s) Notes Reviewed:      Care Discussed with Consultants/Other Providers:

## 2023-05-10 NOTE — BH CONSULTATION LIAISON PROGRESS NOTE - CURRENT MEDICATION
MEDICATIONS  (STANDING):  allopurinol 300 milliGRAM(s) Oral daily  ascorbic acid 500 milliGRAM(s) Oral two times a day  atorvastatin 10 milliGRAM(s) Oral at bedtime  colchicine 0.6 milliGRAM(s) Oral daily  epoetin jane-epbx (RETACRIT) Injectable 68798 Unit(s) SubCutaneous <User Schedule>  famotidine    Tablet 20 milliGRAM(s) Oral daily  melatonin 5 milliGRAM(s) Oral at bedtime  memantine 10 milliGRAM(s) Oral daily  metoprolol succinate  milliGRAM(s) Oral daily  multivitamin 1 Tablet(s) Oral daily  nystatin Powder 1 Application(s) Topical two times a day  QUEtiapine 50 milliGRAM(s) Oral two times a day  QUEtiapine 100 milliGRAM(s) Oral at bedtime  senna 1 Tablet(s) Oral at bedtime  sertraline 50 milliGRAM(s) Oral daily  tamsulosin 0.4 milliGRAM(s) Oral at bedtime    MEDICATIONS  (PRN):  acetaminophen     Tablet .. 650 milliGRAM(s) Oral every 6 hours PRN Mild Pain (1 - 3)  bisacodyl Suppository 10 milliGRAM(s) Rectal daily PRN Constipation  LORazepam     Tablet 0.5 milliGRAM(s) Oral two times a day PRN Agitation  magnesium hydroxide Suspension 30 milliLiter(s) Oral daily PRN Constipation  
MEDICATIONS  (STANDING):  allopurinol 300 milliGRAM(s) Oral daily  ascorbic acid 500 milliGRAM(s) Oral two times a day  atorvastatin 10 milliGRAM(s) Oral at bedtime  colchicine 0.6 milliGRAM(s) Oral daily  epoetin jane-epbx (RETACRIT) Injectable 13038 Unit(s) SubCutaneous <User Schedule>  ertapenem  IVPB      ertapenem  IVPB 1000 milliGRAM(s) IV Intermittent every 24 hours  famotidine    Tablet 20 milliGRAM(s) Oral daily  melatonin 5 milliGRAM(s) Oral at bedtime  memantine 10 milliGRAM(s) Oral daily  metoprolol succinate  milliGRAM(s) Oral daily  multivitamin 1 Tablet(s) Oral daily  nystatin Powder 1 Application(s) Topical two times a day  QUEtiapine 100 milliGRAM(s) Oral at bedtime  QUEtiapine 50 milliGRAM(s) Oral two times a day  senna 1 Tablet(s) Oral at bedtime  sertraline 50 milliGRAM(s) Oral daily  tamsulosin 0.4 milliGRAM(s) Oral at bedtime    MEDICATIONS  (PRN):  acetaminophen     Tablet .. 650 milliGRAM(s) Oral every 6 hours PRN Mild Pain (1 - 3)  bisacodyl Suppository 10 milliGRAM(s) Rectal daily PRN Constipation  LORazepam     Tablet 0.5 milliGRAM(s) Oral two times a day PRN Agitation  magnesium hydroxide Suspension 30 milliLiter(s) Oral daily PRN Constipation

## 2023-05-11 LAB
ANION GAP SERPL CALC-SCNC: 3 MMOL/L — LOW (ref 5–17)
BUN SERPL-MCNC: 23 MG/DL — SIGNIFICANT CHANGE UP (ref 7–23)
CALCIUM SERPL-MCNC: 8.5 MG/DL — SIGNIFICANT CHANGE UP (ref 8.5–10.1)
CHLORIDE SERPL-SCNC: 119 MMOL/L — HIGH (ref 96–108)
CO2 SERPL-SCNC: 23 MMOL/L — SIGNIFICANT CHANGE UP (ref 22–31)
CREAT SERPL-MCNC: 1.6 MG/DL — HIGH (ref 0.5–1.3)
EGFR: 44 ML/MIN/1.73M2 — LOW
GLUCOSE SERPL-MCNC: 80 MG/DL — SIGNIFICANT CHANGE UP (ref 70–99)
HCT VFR BLD CALC: 28.4 % — LOW (ref 39–50)
HGB BLD-MCNC: 8.8 G/DL — LOW (ref 13–17)
MCHC RBC-ENTMCNC: 28.9 PG — SIGNIFICANT CHANGE UP (ref 27–34)
MCHC RBC-ENTMCNC: 31 GM/DL — LOW (ref 32–36)
MCV RBC AUTO: 93.1 FL — SIGNIFICANT CHANGE UP (ref 80–100)
NRBC # BLD: 0 /100 WBCS — SIGNIFICANT CHANGE UP (ref 0–0)
PLATELET # BLD AUTO: 179 K/UL — SIGNIFICANT CHANGE UP (ref 150–400)
POTASSIUM SERPL-MCNC: 3.9 MMOL/L — SIGNIFICANT CHANGE UP (ref 3.5–5.3)
POTASSIUM SERPL-SCNC: 3.9 MMOL/L — SIGNIFICANT CHANGE UP (ref 3.5–5.3)
RBC # BLD: 3.05 M/UL — LOW (ref 4.2–5.8)
RBC # FLD: 16.8 % — HIGH (ref 10.3–14.5)
SODIUM SERPL-SCNC: 145 MMOL/L — SIGNIFICANT CHANGE UP (ref 135–145)
WBC # BLD: 5.52 K/UL — SIGNIFICANT CHANGE UP (ref 3.8–10.5)
WBC # FLD AUTO: 5.52 K/UL — SIGNIFICANT CHANGE UP (ref 3.8–10.5)

## 2023-05-11 PROCEDURE — 99233 SBSQ HOSP IP/OBS HIGH 50: CPT

## 2023-05-11 RX ADMIN — TAMSULOSIN HYDROCHLORIDE 0.4 MILLIGRAM(S): 0.4 CAPSULE ORAL at 22:50

## 2023-05-11 RX ADMIN — Medication 0.6 MILLIGRAM(S): at 14:14

## 2023-05-11 RX ADMIN — QUETIAPINE FUMARATE 100 MILLIGRAM(S): 200 TABLET, FILM COATED ORAL at 22:50

## 2023-05-11 RX ADMIN — QUETIAPINE FUMARATE 50 MILLIGRAM(S): 200 TABLET, FILM COATED ORAL at 19:02

## 2023-05-11 RX ADMIN — SERTRALINE 50 MILLIGRAM(S): 25 TABLET, FILM COATED ORAL at 14:22

## 2023-05-11 RX ADMIN — ATORVASTATIN CALCIUM 10 MILLIGRAM(S): 80 TABLET, FILM COATED ORAL at 22:50

## 2023-05-11 RX ADMIN — Medication 1 TABLET(S): at 14:14

## 2023-05-11 RX ADMIN — Medication 300 MILLIGRAM(S): at 14:14

## 2023-05-11 RX ADMIN — FAMOTIDINE 20 MILLIGRAM(S): 10 INJECTION INTRAVENOUS at 14:14

## 2023-05-11 RX ADMIN — Medication 500 MILLIGRAM(S): at 05:28

## 2023-05-11 RX ADMIN — NYSTATIN CREAM 1 APPLICATION(S): 100000 CREAM TOPICAL at 18:59

## 2023-05-11 RX ADMIN — Medication 500 MILLIGRAM(S): at 19:01

## 2023-05-11 RX ADMIN — NYSTATIN CREAM 1 APPLICATION(S): 100000 CREAM TOPICAL at 05:29

## 2023-05-11 RX ADMIN — QUETIAPINE FUMARATE 50 MILLIGRAM(S): 200 TABLET, FILM COATED ORAL at 05:28

## 2023-05-11 RX ADMIN — SENNA PLUS 1 TABLET(S): 8.6 TABLET ORAL at 22:50

## 2023-05-11 RX ADMIN — Medication 5 MILLIGRAM(S): at 22:50

## 2023-05-11 RX ADMIN — ERTAPENEM SODIUM 120 MILLIGRAM(S): 1 INJECTION, POWDER, LYOPHILIZED, FOR SOLUTION INTRAMUSCULAR; INTRAVENOUS at 14:53

## 2023-05-11 RX ADMIN — MEMANTINE HYDROCHLORIDE 10 MILLIGRAM(S): 10 TABLET ORAL at 14:14

## 2023-05-11 RX ADMIN — Medication 100 MILLIGRAM(S): at 05:29

## 2023-05-11 NOTE — PROGRESS NOTE ADULT - SUBJECTIVE AND OBJECTIVE BOX
Patient is a 77y old  Male who presents with a chief complaint of agitation, CKD, anemia (10 May 2023 16:41)       INTERVAL HPI/OVERNIGHT EVENTS: Patient seen and examined at bedside. Remains on 1:1 observation for safety     MEDICATIONS  (STANDING):  allopurinol 300 milliGRAM(s) Oral daily  ascorbic acid 500 milliGRAM(s) Oral two times a day  atorvastatin 10 milliGRAM(s) Oral at bedtime  colchicine 0.6 milliGRAM(s) Oral daily  epoetin jane-epbx (RETACRIT) Injectable 21271 Unit(s) SubCutaneous <User Schedule>  ertapenem  IVPB 1000 milliGRAM(s) IV Intermittent every 24 hours  ertapenem  IVPB      famotidine    Tablet 20 milliGRAM(s) Oral daily  melatonin 5 milliGRAM(s) Oral at bedtime  memantine 10 milliGRAM(s) Oral daily  metoprolol succinate  milliGRAM(s) Oral daily  multivitamin 1 Tablet(s) Oral daily  nystatin Powder 1 Application(s) Topical two times a day  QUEtiapine 100 milliGRAM(s) Oral at bedtime  QUEtiapine 50 milliGRAM(s) Oral two times a day  senna 1 Tablet(s) Oral at bedtime  sertraline 50 milliGRAM(s) Oral daily  tamsulosin 0.4 milliGRAM(s) Oral at bedtime    MEDICATIONS  (PRN):  acetaminophen     Tablet .. 650 milliGRAM(s) Oral every 6 hours PRN Mild Pain (1 - 3)  bisacodyl Suppository 10 milliGRAM(s) Rectal daily PRN Constipation  LORazepam     Tablet 0.5 milliGRAM(s) Oral two times a day PRN Agitation  magnesium hydroxide Suspension 30 milliLiter(s) Oral daily PRN Constipation      Allergies    No Known Allergies    Intolerances        REVIEW OF SYSTEMS:  Unable to obtain due to confusion   Vital Signs Last 24 Hrs  T(C): 36.6 (11 May 2023 04:57), Max: 36.8 (10 May 2023 21:37)  T(F): 97.8 (11 May 2023 04:57), Max: 98.2 (10 May 2023 21:37)  HR: 81 (11 May 2023 04:57) (80 - 88)  BP: 147/81 (11 May 2023 04:57) (114/84 - 147/81)  BP(mean): --  RR: 18 (11 May 2023 04:57) (18 - 18)  SpO2: 91% (11 May 2023 04:57) (91% - 93%)    Parameters below as of 11 May 2023 04:57  Patient On (Oxygen Delivery Method): room air        PHYSICAL EXAM:  GENERAL: NAD, well-developed, well-groomed  HEENT:  anicteric, moist mucous membranes  CHEST/LUNG:  CTA b/l, no rales, wheezes, or rhonchi  HEART:  RRR, S1, S2  ABDOMEN:  BS+, soft, nontender, nondistended  EXTREMITIES: no edema, cyanosis, or calf tenderness  NERVOUS SYSTEM: answers questions and follows commands appropriately  PSYCH: normal affect  SKIN: No rashes or lesions    LABS:                        8.8    5.52  )-----------( 179      ( 11 May 2023 04:59 )             28.4     11 May 2023 04:59    145    |  119    |  23     ----------------------------<  80     3.9     |  23     |  1.60     Ca    8.5        11 May 2023 04:59        CAPILLARY BLOOD GLUCOSE        BLOOD CULTURE    RADIOLOGY & ADDITIONAL TESTS:    Imaging Personally Reviewed:  [ ] YES     Consultant(s) Notes Reviewed:      Care Discussed with Consultants/Other Providers:
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
## 2023-05-11 NOTE — PROGRESS NOTE ADULT - SUBJECTIVE AND OBJECTIVE BOX
HILLARY RED is a 77yMale , patient examined and chart reviewed.     INTERVAL HPI/ OVERNIGHT EVENTS:   No events. Confused.  Afebrile.     PAST MEDICAL & SURGICAL HISTORY:  Prostate enlargement  Arthritis  HLD (hyperlipidemia)  Gout  h/o HTN (hypertension)  Obesity  H/o Cerebral aneurysm  HTN (hypertension)  Atrial fibrillation  Dementia  Schizophrenia  h/o Cerebral aneurysm coiling  2/18/2011 and 7/15/2011 [facility]  h/o Arthroscopy left knee 2004  h/o Arthroscopy right knee 2007  total knee arthroplasty left 3/5/2012  No significant past surgical history        For details regarding the patient's social history, family history, and other miscellaneous elements, please refer the initial infectious diseases consultation and/or the admitting history and physical examination for this admission.    ROS:  Unable to obtain due to : Dementia      No Known Allergies      Current inpatient medications :    ANTIBIOTICS/RELEVANT:  ertapenem  IVPB 1000 milliGRAM(s) IV Intermittent every 24 hours    MEDICATIONS  (STANDING):  allopurinol 300 milliGRAM(s) Oral daily  ascorbic acid 500 milliGRAM(s) Oral two times a day  atorvastatin 10 milliGRAM(s) Oral at bedtime  colchicine 0.6 milliGRAM(s) Oral daily  epoetin jane-epbx (RETACRIT) Injectable 86892 Unit(s) SubCutaneous <User Schedule>  famotidine    Tablet 20 milliGRAM(s) Oral daily  melatonin 5 milliGRAM(s) Oral at bedtime  memantine 10 milliGRAM(s) Oral daily  metoprolol succinate  milliGRAM(s) Oral daily  multivitamin 1 Tablet(s) Oral daily  nystatin Powder 1 Application(s) Topical two times a day  QUEtiapine 50 milliGRAM(s) Oral two times a day  QUEtiapine 100 milliGRAM(s) Oral at bedtime  senna 1 Tablet(s) Oral at bedtime  sertraline 50 milliGRAM(s) Oral daily  tamsulosin 0.4 milliGRAM(s) Oral at bedtime    MEDICATIONS  (PRN):  acetaminophen     Tablet .. 650 milliGRAM(s) Oral every 6 hours PRN Mild Pain (1 - 3)  bisacodyl Suppository 10 milliGRAM(s) Rectal daily PRN Constipation  LORazepam     Tablet 0.5 milliGRAM(s) Oral two times a day PRN Agitation  magnesium hydroxide Suspension 30 milliLiter(s) Oral daily PRN Constipation        Objective:  Vital Signs Last 24 Hrs  T(C): 36.6 (11 May 2023 04:57), Max: 36.8 (10 May 2023 21:37)  T(F): 97.8 (11 May 2023 04:57), Max: 98.2 (10 May 2023 21:37)  HR: 81 (11 May 2023 04:57) (81 - 88)  BP: 147/81 (11 May 2023 04:57) (114/84 - 147/81)  RR: 18 (11 May 2023 04:57) (18 - 18)  SpO2: 91% (11 May 2023 04:57) (91% - 93%)    Parameters below as of 11 May 2023 04:57  Patient On (Oxygen Delivery Method): room air      Physical Exam:  General: NAD  Neck: supple, trachea midline  Lungs: clear, no wheeze/rhonchi  Cardiovascular: regular rate and rhythm, S1 S2  Abdomen: soft, nontender,  bowel sounds normal  Neurological: Confused  Skin: no rash  Extremities: no edema        LABS:                        8.8    5.52  )-----------( 179      ( 11 May 2023 04:59 )             28.4   05-11    145  |  119<H>  |  23  ----------------------------<  80  3.9   |  23  |  1.60<H>    Ca    8.5      11 May 2023 04:59        Urinalysis (05.03.23 @ 10:40)    Glucose Qualitative, Urine: Negative   Blood, Urine: Large   pH Urine: 5.0   Color: Yellow   Urine Appearance: Clear   Bilirubin: Negative   Ketone - Urine: Negative   Specific Gravity: 1.010   Protein, Urine: 100   Urobilinogen: Negative   Nitrite: Negative   Leukocyte Esterase Concentration: Moderate   Red Blood Cell - Urine: 11-25 /HPF   White Blood Cell - Urine: 6-10   Bacteria: Occasional   Squamous Epithelial Cells: Occasional      MICROBIOLOGY:  Culture - Urine (05.03.23 @ 10:40)    -  Amikacin: S <=16   -  Amoxicillin/Clavulanic Acid: S <=8/4   -  Ampicillin: R >16 These ampicillin results predict results for amoxicillin   -  Ampicillin/Sulbactam: S <=4/2 Enterobacter, Klebsiella aerogenes, Citrobacter, and Serratia may develop resistance during prolonged therapy (3-4 days)   -  Aztreonam: R >16   -  Cefazolin: R >16 For uncomplicated UTI with K. pneumoniae, E. coli, or P. mirablis: STEPHEN <=16 is sensitive and STEPHEN >=32 is resistant. This also predicts results for oral agents cefaclor, cefdinir, cefpodoxime, cefprozil, cefuroxime axetil, cephalexin and locarbef for uncomplicated UTI. Note that some isolates may be susceptible to these agents while testing resistant to cefazolin.   -  Cefepime: R >16   -  Ceftriaxone: R >32 Enterobacter, Klebsiella aerogenes, Citrobacter, and Serratia may develop resistance during prolonged therapy   -  Cefuroxime: R >16   -  Ciprofloxacin: R >2   -  Ertapenem: S <=0.5   -  Gentamicin: R >8   -  Levofloxacin: R >4   -  Meropenem: S <=1   -  Nitrofurantoin: R >64 Should not be used to treat pyelonephritis   -  Piperacillin/Tazobactam: SDD 16   -  Tobramycin: R >8   -  Trimethoprim/Sulfamethoxazole: R >2/38   Specimen Source: Clean Catch Clean Catch (Midstream)   Culture Results:   10,000 - 49,000 CFU/mL Proteus mirabilis ESBL  10,000 - 49,000 CFU/mL Coag Negative Staphylococcus "Susceptibilities not  performed"   Organism Identification: Proteus mirabilis ESBL   Organism: Proteus mirabilis ESBL   Method Type: STEPHEN    Culture - Blood (05.03.23 @ 08:30)    Specimen Source: .Blood Blood-Peripheral   Culture Results:   No growth to date.    Respiratory Viral Panel with COVID-19 by JOSE (05.03.23 @ 08:30)    Rapid RVP Result: Detected   SARS-CoV-2: NotDetec: This Respiratory Panel uses polymerase chain reaction (PCR) to detect for  adenovirus; coronavirus (HKU1, NL63, 229E, OC43); human metapneumovirus  (hMPV); human enterovirus/rhinovirus (Entero/RV); influenza A; influenza  A/H1; influenza A/H3; influenza A/H1-2009; influenza B; parainfluenza  viruses 1, 2, 3, 4; respiratory syncytial virus; Mycoplasma pneumoniae;  Chlamydophila pneumoniae; and SARS-CoV-2.   Parainfluenza 3 (RapRVP): Detected      RADIOLOGY & ADDITIONAL STUDIES:    ACC: [id number] EXAM:  XR CHEST PORTABLE IMMED 1V   ORDERED BY: KWAN THOMAS     PROCEDURE DATE:  05/03/2023          INTERPRETATION:  AP chest on May 3, 2023 at 9:28 AM. 2 images. Patient   has sepsis and altered mental status.    Heart magnified by technique.    Lungs remain clear.    Chest is similar to August 28, 2012.    IMPRESSION: No acute finding or change.    Assessment :   76 y/o PMH dementia, HTN, Afib s/p wachsman 2020, GERD, CKD, anxiety, schizophrenia  admitted for AMS/agitation. Mental status back to baseline- remains confused.  RVP + parainfluenza.   Urine culture with Proteus ESBL. . No fever WBC wnl.   Worsening confusion and agitation ? sec UTI  Remains confused     Plan :   Cont Ertapenam x 5 days till 5/14/23  Trend temps and cbc  Asp precautions  Stable from ID standpoint      Continue with present regiment.  Appropriate use of antibiotics and adverse effects reviewed.    > 35 minutes were spent in direct patient care reviewing notes, medications ,labs data/ imaging , discussion with multidisciplinary team.    Thank you for allowing me to participate in care of your patient .    Fatoumata Miguel MD  Infectious Disease  [phone]

## 2023-05-12 LAB
ALBUMIN SERPL ELPH-MCNC: 2.6 G/DL — LOW (ref 3.3–5)
ALP SERPL-CCNC: 60 U/L — SIGNIFICANT CHANGE UP (ref 40–120)
ALT FLD-CCNC: 25 U/L — SIGNIFICANT CHANGE UP (ref 12–78)
ANION GAP SERPL CALC-SCNC: 5 MMOL/L — SIGNIFICANT CHANGE UP (ref 5–17)
AST SERPL-CCNC: 19 U/L — SIGNIFICANT CHANGE UP (ref 15–37)
BASOPHILS # BLD AUTO: 0.05 K/UL — SIGNIFICANT CHANGE UP (ref 0–0.2)
BASOPHILS NFR BLD AUTO: 0.9 % — SIGNIFICANT CHANGE UP (ref 0–2)
BILIRUB SERPL-MCNC: 0.5 MG/DL — SIGNIFICANT CHANGE UP (ref 0.2–1.2)
BUN SERPL-MCNC: 33 MG/DL — HIGH (ref 7–23)
CALCIUM SERPL-MCNC: 8.3 MG/DL — LOW (ref 8.5–10.1)
CHLORIDE SERPL-SCNC: 115 MMOL/L — HIGH (ref 96–108)
CO2 SERPL-SCNC: 22 MMOL/L — SIGNIFICANT CHANGE UP (ref 22–31)
CREAT SERPL-MCNC: 1.7 MG/DL — HIGH (ref 0.5–1.3)
EGFR: 41 ML/MIN/1.73M2 — LOW
EOSINOPHIL # BLD AUTO: 0.3 K/UL — SIGNIFICANT CHANGE UP (ref 0–0.5)
EOSINOPHIL NFR BLD AUTO: 5.1 % — SIGNIFICANT CHANGE UP (ref 0–6)
GLUCOSE SERPL-MCNC: 86 MG/DL — SIGNIFICANT CHANGE UP (ref 70–99)
HCT VFR BLD CALC: 27.1 % — LOW (ref 39–50)
HGB BLD-MCNC: 8.6 G/DL — LOW (ref 13–17)
IMM GRANULOCYTES NFR BLD AUTO: 0.2 % — SIGNIFICANT CHANGE UP (ref 0–0.9)
LYMPHOCYTES # BLD AUTO: 1.95 K/UL — SIGNIFICANT CHANGE UP (ref 1–3.3)
LYMPHOCYTES # BLD AUTO: 33.3 % — SIGNIFICANT CHANGE UP (ref 13–44)
MCHC RBC-ENTMCNC: 29.4 PG — SIGNIFICANT CHANGE UP (ref 27–34)
MCHC RBC-ENTMCNC: 31.7 GM/DL — LOW (ref 32–36)
MCV RBC AUTO: 92.5 FL — SIGNIFICANT CHANGE UP (ref 80–100)
MONOCYTES # BLD AUTO: 0.59 K/UL — SIGNIFICANT CHANGE UP (ref 0–0.9)
MONOCYTES NFR BLD AUTO: 10.1 % — SIGNIFICANT CHANGE UP (ref 2–14)
NEUTROPHILS # BLD AUTO: 2.95 K/UL — SIGNIFICANT CHANGE UP (ref 1.8–7.4)
NEUTROPHILS NFR BLD AUTO: 50.4 % — SIGNIFICANT CHANGE UP (ref 43–77)
NRBC # BLD: 0 /100 WBCS — SIGNIFICANT CHANGE UP (ref 0–0)
PLATELET # BLD AUTO: 168 K/UL — SIGNIFICANT CHANGE UP (ref 150–400)
POTASSIUM SERPL-MCNC: 4.3 MMOL/L — SIGNIFICANT CHANGE UP (ref 3.5–5.3)
POTASSIUM SERPL-SCNC: 4.3 MMOL/L — SIGNIFICANT CHANGE UP (ref 3.5–5.3)
PROT SERPL-MCNC: 5.1 G/DL — LOW (ref 6–8.3)
RBC # BLD: 2.93 M/UL — LOW (ref 4.2–5.8)
RBC # FLD: 16.6 % — HIGH (ref 10.3–14.5)
SODIUM SERPL-SCNC: 142 MMOL/L — SIGNIFICANT CHANGE UP (ref 135–145)
WBC # BLD: 5.85 K/UL — SIGNIFICANT CHANGE UP (ref 3.8–10.5)
WBC # FLD AUTO: 5.85 K/UL — SIGNIFICANT CHANGE UP (ref 3.8–10.5)

## 2023-05-12 PROCEDURE — 99233 SBSQ HOSP IP/OBS HIGH 50: CPT

## 2023-05-12 RX ADMIN — Medication 0.6 MILLIGRAM(S): at 15:07

## 2023-05-12 RX ADMIN — MEMANTINE HYDROCHLORIDE 10 MILLIGRAM(S): 10 TABLET ORAL at 15:07

## 2023-05-12 RX ADMIN — ATORVASTATIN CALCIUM 10 MILLIGRAM(S): 80 TABLET, FILM COATED ORAL at 22:22

## 2023-05-12 RX ADMIN — Medication 500 MILLIGRAM(S): at 18:55

## 2023-05-12 RX ADMIN — Medication 100 MILLIGRAM(S): at 06:11

## 2023-05-12 RX ADMIN — Medication 300 MILLIGRAM(S): at 15:07

## 2023-05-12 RX ADMIN — TAMSULOSIN HYDROCHLORIDE 0.4 MILLIGRAM(S): 0.4 CAPSULE ORAL at 22:22

## 2023-05-12 RX ADMIN — QUETIAPINE FUMARATE 50 MILLIGRAM(S): 200 TABLET, FILM COATED ORAL at 18:55

## 2023-05-12 RX ADMIN — ERYTHROPOIETIN 10000 UNIT(S): 10000 INJECTION, SOLUTION INTRAVENOUS; SUBCUTANEOUS at 18:55

## 2023-05-12 RX ADMIN — NYSTATIN CREAM 1 APPLICATION(S): 100000 CREAM TOPICAL at 18:55

## 2023-05-12 RX ADMIN — Medication 1 TABLET(S): at 15:08

## 2023-05-12 RX ADMIN — NYSTATIN CREAM 1 APPLICATION(S): 100000 CREAM TOPICAL at 06:11

## 2023-05-12 RX ADMIN — Medication 500 MILLIGRAM(S): at 06:11

## 2023-05-12 RX ADMIN — SENNA PLUS 1 TABLET(S): 8.6 TABLET ORAL at 22:22

## 2023-05-12 RX ADMIN — Medication 5 MILLIGRAM(S): at 22:22

## 2023-05-12 RX ADMIN — ERTAPENEM SODIUM 120 MILLIGRAM(S): 1 INJECTION, POWDER, LYOPHILIZED, FOR SOLUTION INTRAMUSCULAR; INTRAVENOUS at 15:08

## 2023-05-12 RX ADMIN — QUETIAPINE FUMARATE 50 MILLIGRAM(S): 200 TABLET, FILM COATED ORAL at 06:11

## 2023-05-12 RX ADMIN — QUETIAPINE FUMARATE 100 MILLIGRAM(S): 200 TABLET, FILM COATED ORAL at 22:22

## 2023-05-12 RX ADMIN — FAMOTIDINE 20 MILLIGRAM(S): 10 INJECTION INTRAVENOUS at 15:07

## 2023-05-12 RX ADMIN — SERTRALINE 50 MILLIGRAM(S): 25 TABLET, FILM COATED ORAL at 15:07

## 2023-05-12 NOTE — PROGRESS NOTE ADULT - ASSESSMENT
76 y/o PMH dementia, HTN, Afib s/p wachsman 2020, GERD, CKD, anxiety, schizophrenia who was admitted for AMS/agitation. Patient calm and cooperative at bedside. sister reports almost back to baseline     A/P:  metabolic encephalopathy   dementia  -+parainfluenza virus : Symptomatic care     - UC showing ESBL proteus. Patient with recent admission at [facility] for UTI/infective stone s/p ureteral stents     - BC NGTD     - continue Invanz per ID, last dose 05/14/2023     - psych following on Seroquel    - Neuro Dr. Ortiz     - Off of 1:1 observation. Mentals status stable       afib     - cont metoprolol      acute on chronic renal disease    - h/o renal osteodystrophy     - cont BMP. avoid nephrotoxic medications     - Cr stable.     anemia    - hgb stable. continue to monitor    - no signs of active bleeding. continue retacrit.     HLD    - cont pravastatin     DVT proph: SCDs    D/w wife at bedside, Mrs Torres, she came to visit him. Updated her about the plan

## 2023-05-12 NOTE — PROGRESS NOTE ADULT - SUBJECTIVE AND OBJECTIVE BOX
Patient is a 77y old  Male who presents with a chief complaint of agitation, CKD, anemia (11 May 2023 14:09)       INTERVAL HPI/OVERNIGHT EVENTS: Patient seen and examined at bedside. confused, lethargic. Won't answer questions. Responsive to verbal stimuli     MEDICATIONS  (STANDING):  allopurinol 300 milliGRAM(s) Oral daily  ascorbic acid 500 milliGRAM(s) Oral two times a day  atorvastatin 10 milliGRAM(s) Oral at bedtime  colchicine 0.6 milliGRAM(s) Oral daily  epoetin jane-epbx (RETACRIT) Injectable 53429 Unit(s) SubCutaneous <User Schedule>  ertapenem  IVPB      ertapenem  IVPB 1000 milliGRAM(s) IV Intermittent every 24 hours  famotidine    Tablet 20 milliGRAM(s) Oral daily  melatonin 5 milliGRAM(s) Oral at bedtime  memantine 10 milliGRAM(s) Oral daily  metoprolol succinate  milliGRAM(s) Oral daily  multivitamin 1 Tablet(s) Oral daily  nystatin Powder 1 Application(s) Topical two times a day  QUEtiapine 50 milliGRAM(s) Oral two times a day  QUEtiapine 100 milliGRAM(s) Oral at bedtime  senna 1 Tablet(s) Oral at bedtime  sertraline 50 milliGRAM(s) Oral daily  tamsulosin 0.4 milliGRAM(s) Oral at bedtime    MEDICATIONS  (PRN):  acetaminophen     Tablet .. 650 milliGRAM(s) Oral every 6 hours PRN Mild Pain (1 - 3)  bisacodyl Suppository 10 milliGRAM(s) Rectal daily PRN Constipation  LORazepam     Tablet 0.5 milliGRAM(s) Oral two times a day PRN Agitation  magnesium hydroxide Suspension 30 milliLiter(s) Oral daily PRN Constipation      Allergies    No Known Allergies    Intolerances        REVIEW OF SYSTEMS:  Unable to obtain. Confused at baseline due to dementia   Vital Signs Last 24 Hrs  T(C): 36.4 (12 May 2023 04:50), Max: 36.8 (11 May 2023 19:58)  T(F): 97.5 (12 May 2023 04:50), Max: 98.2 (11 May 2023 19:58)  HR: 70 (12 May 2023 04:50) (70 - 77)  BP: 129/71 (12 May 2023 04:50) (116/67 - 129/71)  BP(mean): --  RR: 18 (12 May 2023 04:50) (18 - 18)  SpO2: 98% (12 May 2023 04:50) (95% - 98%)    Parameters below as of 12 May 2023 04:50  Patient On (Oxygen Delivery Method): room air        PHYSICAL EXAM:  GENERAL: NAD, Alert   HEENT:  anicteric, moist mucous membranes  CHEST/LUNG:  CTA b/l, no rales, wheezes, or rhonchi  HEART:  RRR, S1, S2  ABDOMEN:  BS+, soft, nontender, nondistended  EXTREMITIES: no edema, cyanosis, or calf tenderness  NERVOUS SYSTEM: answers questions and follows commands appropriately  PSYCH: normal affect  SKIN: No rashes or lesions    LABS:                        8.6    5.85  )-----------( 168      ( 12 May 2023 05:26 )             27.1     12 May 2023 05:26    142    |  115    |  33     ----------------------------<  86     4.3     |  22     |  1.70     Ca    8.3        12 May 2023 05:26    TPro  5.1    /  Alb  2.6    /  TBili  0.5    /  DBili  x      /  AST  19     /  ALT  25     /  AlkPhos  60     12 May 2023 05:26      CAPILLARY BLOOD GLUCOSE        BLOOD CULTURE    RADIOLOGY & ADDITIONAL TESTS:    Imaging Personally Reviewed:  [ ] YES     Consultant(s) Notes Reviewed:      Care Discussed with Consultants/Other Providers:

## 2023-05-12 NOTE — PROGRESS NOTE ADULT - SUBJECTIVE AND OBJECTIVE BOX
Patient is a 77y old  Male who presents with a chief complaint of agitation, CKD, anemia (08 May 2023 14:44)    Patient seen in follow up for CKD 3.        PAST MEDICAL HISTORY:  Prostate enlargement    Arthritis    Arthritis    HLD (hyperlipidemia)    Gout    h/o HTN (hypertension)    Obesity    H/o Cerebral aneurysm    HTN (hypertension)    Atrial fibrillation    Dementia    Schizophrenia      MEDICATIONS  (STANDING):  allopurinol 300 milliGRAM(s) Oral daily  ascorbic acid 500 milliGRAM(s) Oral two times a day  atorvastatin 10 milliGRAM(s) Oral at bedtime  colchicine 0.6 milliGRAM(s) Oral daily  epoetin jane-epbx (RETACRIT) Injectable 18606 Unit(s) SubCutaneous <User Schedule>  ertapenem  IVPB 1000 milliGRAM(s) IV Intermittent every 24 hours  ertapenem  IVPB      famotidine    Tablet 20 milliGRAM(s) Oral daily  melatonin 5 milliGRAM(s) Oral at bedtime  memantine 10 milliGRAM(s) Oral daily  metoprolol succinate  milliGRAM(s) Oral daily  multivitamin 1 Tablet(s) Oral daily  nystatin Powder 1 Application(s) Topical two times a day  QUEtiapine 50 milliGRAM(s) Oral two times a day  QUEtiapine 100 milliGRAM(s) Oral at bedtime  senna 1 Tablet(s) Oral at bedtime  sertraline 50 milliGRAM(s) Oral daily  tamsulosin 0.4 milliGRAM(s) Oral at bedtime    MEDICATIONS  (PRN):  acetaminophen     Tablet .. 650 milliGRAM(s) Oral every 6 hours PRN Mild Pain (1 - 3)  bisacodyl Suppository 10 milliGRAM(s) Rectal daily PRN Constipation  LORazepam     Tablet 0.5 milliGRAM(s) Oral two times a day PRN Agitation  magnesium hydroxide Suspension 30 milliLiter(s) Oral daily PRN Constipation    T(C): 36.4 (05-12-23 @ 04:50), Max: 36.8 (05-10-23 @ 21:37)  HR: 70 (05-12-23 @ 04:50) (70 - 88)  BP: 129/71 (05-12-23 @ 04:50) (114/84 - 147/81)  RR: 18 (05-12-23 @ 04:50)  SpO2: 98% (05-12-23 @ 04:50)  Wt(kg): --  I&O's Detail        PHYSICAL EXAM:  General: No distress  Respiratory: b/l air entry  Cardiovascular: S1 S2  Gastrointestinal: soft  Extremities:  + edema                           LABORATORY:                        8.6    5.85  )-----------( 168      ( 12 May 2023 05:26 )             27.1     05-12    142  |  115<H>  |  33<H>  ----------------------------<  86  4.3   |  22  |  1.70<H>    Ca    8.3<L>      12 May 2023 05:26    TPro  5.1<L>  /  Alb  2.6<L>  /  TBili  0.5  /  DBili  x   /  AST  19  /  ALT  25  /  AlkPhos  60  05-12    Sodium, Serum: 142 mmol/L (05-12 @ 05:26)  Sodium, Serum: 145 mmol/L (05-11 @ 04:59)    Potassium, Serum: 4.3 mmol/L (05-12 @ 05:26)  Potassium, Serum: 3.9 mmol/L (05-11 @ 04:59)    Hemoglobin: 8.6 g/dL (05-12 @ 05:26)  Hemoglobin: 8.8 g/dL (05-11 @ 04:59)  Hemoglobin: 9.3 g/dL (05-10 @ 05:54)    Creatinine, Serum 1.70 (05-12 @ 05:26)  Creatinine, Serum 1.60 (05-11 @ 04:59)  Creatinine, Serum 1.60 (05-10 @ 05:54)        LIVER FUNCTIONS - ( 12 May 2023 05:26 )  Alb: 2.6 g/dL / Pro: 5.1 g/dL / ALK PHOS: 60 U/L / ALT: 25 U/L / AST: 19 U/L / GGT: x

## 2023-05-12 NOTE — PROGRESS NOTE ADULT - SUBJECTIVE AND OBJECTIVE BOX
Neurology Follow up note    HILLARY NAVARROEFF77yMale    HPI:  77-year-old white male with history of dementia hypertension atrial fibrillation s/p [employer] 2020 anemia BPH hyperlipidemia GERD gout schizophrenia anxiety disorder CKD as well as renal osteodystrophy sent to the emergency department here at [facility] today for evaluation of "altered mental status".  Per transfer papers from [facility] as well as from [facility] EMS who transported patient here patient has been more aggressive in terms of behavior and they felt he may be in danger to himself and potentially others.  Wife got called by the facility this AM and heard pt screaming "everyone is dead here" and threatening to call the police. Niece at bedside also reports accounts over the wknd of pt undressing himself including his diaper and smearing feces around the facility. Family reports ICU admission at [facility] march 2023 2/2 sepsis. They also recount rapid and progressive decline in health and hygiene w/>70lb weight loss in recent months and worsening dementia. No additional subjective history is available at this time secondary to patient's baseline dementia.  Pt is currently AAOX1 to self and tearful saying he is going to die and asking for his wife.    In the ED,  Vitals: 97.9F rectal, HR 90 bpm, NIBP 117/80, RR 18 breaths/min, SpO2 100% on RA  labs significant for hgb 8.6, Cr 2.3  UA: neg for UTI  + parainfluenza  s/p 2.4L LR bolus  CT head non con: Anterior parasellar aneurysm coil. No acute hemorrhage, extra-axial collection or mass effect. Bilateral maxillary sinus air-fluid levels.   CXR: No acute finding or change.  ECG: AFib 78ms, Qtc 461ms     (03 May 2023 15:11)      Interval History -calmer today    Patient is seen, chart was reviewed and case was discussed with the treatment team.  Pt is not in any distress.   Lying on bed comfortably.   No events reported overnight.       Vital Signs Last 24 Hrs  T(C): 36.4 (12 May 2023 13:07), Max: 36.8 (11 May 2023 19:58)  T(F): 97.6 (12 May 2023 13:07), Max: 98.2 (11 May 2023 19:58)  HR: 68 (12 May 2023 13:07) (68 - 77)  BP: 110/69 (12 May 2023 13:07) (110/69 - 129/71)  BP(mean): --  RR: 18 (12 May 2023 13:07) (18 - 18)  SpO2: 96% (12 May 2023 13:07) (95% - 98%)    Parameters below as of 12 May 2023 13:07  Patient On (Oxygen Delivery Method): room air            REVIEW OF SYSTEMS:    Constitutional: No fever, weight loss or fatigue  Eyes: No eye pain, visual disturbances, or discharge  ENT:  No difficulty hearing, tinnitus, vertigo; No sinus or throat pain  Neck: No pain or stiffness  Respiratory: No, wheezing, chills or hemoptysis  Cardiovascular: No chest pain, palpitations, shortness of breath, dizziness or leg swelling  Gastrointestinal: No abdominal or epigastric pain. No nausea, vomiting or hematemesis; No diarrhea or constipation. No melena or hematochezia.  Genitourinary: No dysuria, frequency, hematuria or incontinence  Neurological: No headaches,  Psychiatric: No  mood swings or difficulty sleeping  Musculoskeletal: No joint pain or swelling; No muscle, back or extremity pain  Skin: No itching, burning, rashes or lesions   Lymph Nodes: No enlarged glands  Endocrine: No heat or cold intolerance; No hair loss,   Allergy and Immunologic: No hives or eczema    On Neurological Examination:    Mental Status - Pt is alert, awake, oriented X2.  Follows commands well and able to answer questions appropriately.Mood and affect  normal    Speech -  Normal.     Cranial Nerves - Pupils 3 mm equal and reactive to light, extraocular eye movements intact. Pt has no visual field deficit.  Pt has no facial asymmetry. Facial sensation is intact.Tongue - is in midline.    Muscle tone - is pradeep     Motor Exam - 5/5 of UE  LE 4/5  No drift. No shaking or tremors.    Sensory Exam - Pt withdraws all extremities equally on stimulation. No asymmetry seen. No complaints of tingling, numbness    coordination:    Finger to nose: normal        Deep tendon Reflexes - 2 plus all over.         Neck Supple -  Yes.     MEDICATIONS    acetaminophen     Tablet .. 650 milliGRAM(s) Oral every 6 hours PRN  allopurinol 300 milliGRAM(s) Oral daily  ascorbic acid 500 milliGRAM(s) Oral two times a day  atorvastatin 10 milliGRAM(s) Oral at bedtime  bisacodyl Suppository 10 milliGRAM(s) Rectal daily PRN  colchicine 0.6 milliGRAM(s) Oral daily  epoetin jane-epbx (RETACRIT) Injectable 66742 Unit(s) SubCutaneous <User Schedule>  ertapenem  IVPB      ertapenem  IVPB 1000 milliGRAM(s) IV Intermittent every 24 hours  famotidine    Tablet 20 milliGRAM(s) Oral daily  LORazepam     Tablet 0.5 milliGRAM(s) Oral two times a day PRN  magnesium hydroxide Suspension 30 milliLiter(s) Oral daily PRN  melatonin 5 milliGRAM(s) Oral at bedtime  memantine 10 milliGRAM(s) Oral daily  metoprolol succinate  milliGRAM(s) Oral daily  multivitamin 1 Tablet(s) Oral daily  nystatin Powder 1 Application(s) Topical two times a day  QUEtiapine 50 milliGRAM(s) Oral two times a day  QUEtiapine 100 milliGRAM(s) Oral at bedtime  senna 1 Tablet(s) Oral at bedtime  sertraline 50 milliGRAM(s) Oral daily  tamsulosin 0.4 milliGRAM(s) Oral at bedtime      Allergies    No Known Allergies    Intolerances        LABS:  CBC Full  -  ( 12 May 2023 05:26 )  WBC Count : 5.85 K/uL  RBC Count : 2.93 M/uL  Hemoglobin : 8.6 g/dL  Hematocrit : 27.1 %  Platelet Count - Automated : 168 K/uL  Mean Cell Volume : 92.5 fl   0.9 %      05-12    142  |  115<H>  |  33<H>  ----------------------------<  86  4.3   |  22  |  1.70<H>    Ca    8.3<L>      12 May 2023 05:26    TPro  5.1<L>  /  Alb  2.6<L>  /  TBili  0.5  /  DBili  x   /  AST  19  /  ALT  25  /  AlkPhos  60  05-12    Hemoglobin A1C:     Vitamin B12     RADIOLOGY    ASSESSMENT AND PLAN:      seen for ams  likely ME  Underlying dementia  sepsis    antibiotic as per ID  Continue namenda for dementia  Physical therapy evaluation.  OOB to chair/ambulation with assistance only.  Pain is accessed and addressed.  Would continue to follow.

## 2023-05-12 NOTE — PROGRESS NOTE ADULT - ASSESSMENT
CKD 3, Prerenal azotemia  Anemia  Dementia  Hypertension    Renal indices stable. To continue current meds. Monitor h/h trend. Transfuse PRBC's PRN.   Monitor BP trend. Titrate BP meds as needed. Will follow electrolytes and renal function trend.

## 2023-05-12 NOTE — PROGRESS NOTE ADULT - SUBJECTIVE AND OBJECTIVE BOX
HILLARY RED is a 77yMale , patient examined and chart reviewed.     INTERVAL HPI/ OVERNIGHT EVENTS:   No events. Confused.  Afebrile. Calm Off 1:1.  In chair.    PAST MEDICAL & SURGICAL HISTORY:  Prostate enlargement  Arthritis  HLD (hyperlipidemia)  Gout  h/o HTN (hypertension)  Obesity  H/o Cerebral aneurysm  HTN (hypertension)  Atrial fibrillation  Dementia  Schizophrenia  h/o Cerebral aneurysm coiling  2/18/2011 and 7/15/2011 [facility]  h/o Arthroscopy left knee 2004  h/o Arthroscopy right knee 2007  total knee arthroplasty left 3/5/2012  No significant past surgical history        For details regarding the patient's social history, family history, and other miscellaneous elements, please refer the initial infectious diseases consultation and/or the admitting history and physical examination for this admission.    ROS:  Unable to obtain due to : Dementia      No Known Allergies      Current inpatient medications :    ANTIBIOTICS/RELEVANT:  ertapenem  IVPB 1000 milliGRAM(s) IV Intermittent every 24 hours    MEDICATIONS  (STANDING):  allopurinol 300 milliGRAM(s) Oral daily  ascorbic acid 500 milliGRAM(s) Oral two times a day  atorvastatin 10 milliGRAM(s) Oral at bedtime  colchicine 0.6 milliGRAM(s) Oral daily  epoetin jane-epbx (RETACRIT) Injectable 89712 Unit(s) SubCutaneous <User Schedule>  famotidine    Tablet 20 milliGRAM(s) Oral daily  melatonin 5 milliGRAM(s) Oral at bedtime  memantine 10 milliGRAM(s) Oral daily  metoprolol succinate  milliGRAM(s) Oral daily  multivitamin 1 Tablet(s) Oral daily  nystatin Powder 1 Application(s) Topical two times a day  QUEtiapine 100 milliGRAM(s) Oral at bedtime  QUEtiapine 50 milliGRAM(s) Oral two times a day  senna 1 Tablet(s) Oral at bedtime  sertraline 50 milliGRAM(s) Oral daily  tamsulosin 0.4 milliGRAM(s) Oral at bedtime    MEDICATIONS  (PRN):  acetaminophen     Tablet .. 650 milliGRAM(s) Oral every 6 hours PRN Mild Pain (1 - 3)  bisacodyl Suppository 10 milliGRAM(s) Rectal daily PRN Constipation  LORazepam     Tablet 0.5 milliGRAM(s) Oral two times a day PRN Agitation  magnesium hydroxide Suspension 30 milliLiter(s) Oral daily PRN Constipation        Objective:  Vital Signs Last 24 Hrs  T(C): 36.4 (12 May 2023 13:07), Max: 36.8 (11 May 2023 19:58)  T(F): 97.6 (12 May 2023 13:07), Max: 98.2 (11 May 2023 19:58)  HR: 68 (12 May 2023 13:07) (68 - 77)  BP: 110/69 (12 May 2023 13:07) (110/69 - 129/71)  RR: 18 (12 May 2023 13:07) (18 - 18)  SpO2: 96% (12 May 2023 13:07) (95% - 98%)    Parameters below as of 12 May 2023 13:07  Patient On (Oxygen Delivery Method): room air    Physical Exam:  General: NAD  Neck: supple, trachea midline  Lungs: clear, no wheeze/rhonchi  Cardiovascular: regular rate and rhythm, S1 S2  Abdomen: soft, nontender,  bowel sounds normal  Neurological: Confused  Skin: no rash  Extremities: no edema        LABS:                        8.6    5.85  )-----------( 168      ( 12 May 2023 05:26 )             27.1   05-12    142  |  115<H>  |  33<H>  ----------------------------<  86  4.3   |  22  |  1.70<H>    Ca    8.3<L>      12 May 2023 05:26    TPro  5.1<L>  /  Alb  2.6<L>  /  TBili  0.5  /  DBili  x   /  AST  19  /  ALT  25  /  AlkPhos  60  05-12    Urinalysis (05.03.23 @ 10:40)   Glucose Qualitative, Urine: Negative   Blood, Urine: Large   pH Urine: 5.0   Color: Yellow   Urine Appearance: Clear   Bilirubin: Negative   Ketone - Urine: Negative   Specific Gravity: 1.010   Protein, Urine: 100   Urobilinogen: Negative   Nitrite: Negative   Leukocyte Esterase Concentration: Moderate   Red Blood Cell - Urine: 11-25 /HPF   White Blood Cell - Urine: 6-10   Bacteria: Occasional   Squamous Epithelial Cells: Occasional      MICROBIOLOGY:  Culture - Urine (05.03.23 @ 10:40)    -  Amikacin: S <=16   -  Amoxicillin/Clavulanic Acid: S <=8/4   -  Ampicillin: R >16 These ampicillin results predict results for amoxicillin   -  Ampicillin/Sulbactam: S <=4/2 Enterobacter, Klebsiella aerogenes, Citrobacter, and Serratia may develop resistance during prolonged therapy (3-4 days)   -  Aztreonam: R >16   -  Cefazolin: R >16 For uncomplicated UTI with K. pneumoniae, E. coli, or P. mirablis: STEPHEN <=16 is sensitive and STEPHEN >=32 is resistant. This also predicts results for oral agents cefaclor, cefdinir, cefpodoxime, cefprozil, cefuroxime axetil, cephalexin and locarbef for uncomplicated UTI. Note that some isolates may be susceptible to these agents while testing resistant to cefazolin.   -  Cefepime: R >16   -  Ceftriaxone: R >32 Enterobacter, Klebsiella aerogenes, Citrobacter, and Serratia may develop resistance during prolonged therapy   -  Cefuroxime: R >16   -  Ciprofloxacin: R >2   -  Ertapenem: S <=0.5   -  Gentamicin: R >8   -  Levofloxacin: R >4   -  Meropenem: S <=1   -  Nitrofurantoin: R >64 Should not be used to treat pyelonephritis   -  Piperacillin/Tazobactam: SDD 16   -  Tobramycin: R >8   -  Trimethoprim/Sulfamethoxazole: R >2/38   Specimen Source: Clean Catch Clean Catch (Midstream)   Culture Results:   10,000 - 49,000 CFU/mL Proteus mirabilis ESBL  10,000 - 49,000 CFU/mL Coag Negative Staphylococcus "Susceptibilities not  performed"   Organism Identification: Proteus mirabilis ESBL   Organism: Proteus mirabilis ESBL   Method Type: STEPHEN    Culture - Blood (05.03.23 @ 08:30)    Specimen Source: .Blood Blood-Peripheral   Culture Results:   No growth to date.    Respiratory Viral Panel with COVID-19 by JOSE (05.03.23 @ 08:30)    Rapid RVP Result: Detected   SARS-CoV-2: NotDetec: This Respiratory Panel uses polymerase chain reaction (PCR) to detect for  adenovirus; coronavirus (HKU1, NL63, 229E, OC43); human metapneumovirus  (hMPV); human enterovirus/rhinovirus (Entero/RV); influenza A; influenza  A/H1; influenza A/H3; influenza A/H1-2009; influenza B; parainfluenza  viruses 1, 2, 3, 4; respiratory syncytial virus; Mycoplasma pneumoniae;  Chlamydophila pneumoniae; and SARS-CoV-2.   Parainfluenza 3 (RapRVP): Detected      RADIOLOGY & ADDITIONAL STUDIES:    ACC: [id number] EXAM:  XR CHEST PORTABLE IMMED 1V   ORDERED BY: KWAN THOMAS     PROCEDURE DATE:  05/03/2023          INTERPRETATION:  AP chest on May 3, 2023 at 9:28 AM. 2 images. Patient   has sepsis and altered mental status.    Heart magnified by technique.    Lungs remain clear.    Chest is similar to August 28, 2012.    IMPRESSION: No acute finding or change.    Assessment :   76 y/o PMH dementia, HTN, Afib s/p wachsman 2020, GERD, CKD, anxiety, schizophrenia  admitted for AMS/agitation. Mental status back to baseline- remains confused.  RVP + parainfluenza.   Urine culture with Proteus ESBL. . No fever WBC wnl.   Worsening confusion and agitation ? sec UTI  Remains confused but calm today- started on seroquel    Plan :   Cont Ertapenam x 5 days till 5/14/23  Trend temps and cbc  Asp precautions  Stable from ID standpoint      Continue with present regiment.  Appropriate use of antibiotics and adverse effects reviewed.    > 35 minutes were spent in direct patient care reviewing notes, medications ,labs data/ imaging , discussion with multidisciplinary team.    Thank you for allowing me to participate in care of your patient .    Fatoumata Miguel MD  Infectious Disease  [phone]

## 2023-05-13 LAB
ANION GAP SERPL CALC-SCNC: 3 MMOL/L — LOW (ref 5–17)
BUN SERPL-MCNC: 36 MG/DL — HIGH (ref 7–23)
CALCIUM SERPL-MCNC: 8.7 MG/DL — SIGNIFICANT CHANGE UP (ref 8.5–10.1)
CHLORIDE SERPL-SCNC: 115 MMOL/L — HIGH (ref 96–108)
CO2 SERPL-SCNC: 21 MMOL/L — LOW (ref 22–31)
CREAT SERPL-MCNC: 1.7 MG/DL — HIGH (ref 0.5–1.3)
EGFR: 41 ML/MIN/1.73M2 — LOW
GLUCOSE SERPL-MCNC: 92 MG/DL — SIGNIFICANT CHANGE UP (ref 70–99)
HCT VFR BLD CALC: 28.5 % — LOW (ref 39–50)
HGB BLD-MCNC: 8.8 G/DL — LOW (ref 13–17)
MCHC RBC-ENTMCNC: 28.6 PG — SIGNIFICANT CHANGE UP (ref 27–34)
MCHC RBC-ENTMCNC: 30.9 GM/DL — LOW (ref 32–36)
MCV RBC AUTO: 92.5 FL — SIGNIFICANT CHANGE UP (ref 80–100)
NRBC # BLD: 0 /100 WBCS — SIGNIFICANT CHANGE UP (ref 0–0)
PLATELET # BLD AUTO: 174 K/UL — SIGNIFICANT CHANGE UP (ref 150–400)
POTASSIUM SERPL-MCNC: 3.9 MMOL/L — SIGNIFICANT CHANGE UP (ref 3.5–5.3)
POTASSIUM SERPL-SCNC: 3.9 MMOL/L — SIGNIFICANT CHANGE UP (ref 3.5–5.3)
RBC # BLD: 3.08 M/UL — LOW (ref 4.2–5.8)
RBC # FLD: 16.5 % — HIGH (ref 10.3–14.5)
SODIUM SERPL-SCNC: 139 MMOL/L — SIGNIFICANT CHANGE UP (ref 135–145)
WBC # BLD: 6.04 K/UL — SIGNIFICANT CHANGE UP (ref 3.8–10.5)
WBC # FLD AUTO: 6.04 K/UL — SIGNIFICANT CHANGE UP (ref 3.8–10.5)

## 2023-05-13 PROCEDURE — 99233 SBSQ HOSP IP/OBS HIGH 50: CPT

## 2023-05-13 RX ADMIN — NYSTATIN CREAM 1 APPLICATION(S): 100000 CREAM TOPICAL at 05:48

## 2023-05-13 RX ADMIN — Medication 1 TABLET(S): at 12:35

## 2023-05-13 RX ADMIN — Medication 100 MILLIGRAM(S): at 05:47

## 2023-05-13 RX ADMIN — Medication 500 MILLIGRAM(S): at 17:42

## 2023-05-13 RX ADMIN — Medication 0.6 MILLIGRAM(S): at 12:34

## 2023-05-13 RX ADMIN — MEMANTINE HYDROCHLORIDE 10 MILLIGRAM(S): 10 TABLET ORAL at 12:34

## 2023-05-13 RX ADMIN — NYSTATIN CREAM 1 APPLICATION(S): 100000 CREAM TOPICAL at 18:00

## 2023-05-13 RX ADMIN — ERTAPENEM SODIUM 120 MILLIGRAM(S): 1 INJECTION, POWDER, LYOPHILIZED, FOR SOLUTION INTRAMUSCULAR; INTRAVENOUS at 14:39

## 2023-05-13 RX ADMIN — SENNA PLUS 1 TABLET(S): 8.6 TABLET ORAL at 21:25

## 2023-05-13 RX ADMIN — Medication 500 MILLIGRAM(S): at 05:46

## 2023-05-13 RX ADMIN — FAMOTIDINE 20 MILLIGRAM(S): 10 INJECTION INTRAVENOUS at 12:34

## 2023-05-13 RX ADMIN — Medication 5 MILLIGRAM(S): at 21:24

## 2023-05-13 RX ADMIN — Medication 0.5 MILLIGRAM(S): at 00:29

## 2023-05-13 RX ADMIN — QUETIAPINE FUMARATE 50 MILLIGRAM(S): 200 TABLET, FILM COATED ORAL at 05:47

## 2023-05-13 RX ADMIN — SERTRALINE 50 MILLIGRAM(S): 25 TABLET, FILM COATED ORAL at 12:36

## 2023-05-13 RX ADMIN — Medication 300 MILLIGRAM(S): at 12:34

## 2023-05-13 RX ADMIN — QUETIAPINE FUMARATE 100 MILLIGRAM(S): 200 TABLET, FILM COATED ORAL at 21:25

## 2023-05-13 RX ADMIN — TAMSULOSIN HYDROCHLORIDE 0.4 MILLIGRAM(S): 0.4 CAPSULE ORAL at 21:24

## 2023-05-13 RX ADMIN — ATORVASTATIN CALCIUM 10 MILLIGRAM(S): 80 TABLET, FILM COATED ORAL at 21:25

## 2023-05-13 RX ADMIN — QUETIAPINE FUMARATE 50 MILLIGRAM(S): 200 TABLET, FILM COATED ORAL at 17:42

## 2023-05-13 NOTE — PROGRESS NOTE ADULT - SUBJECTIVE AND OBJECTIVE BOX
Patient is a 77y old  Male who presents with a chief complaint of agitation, CKD, anemia (12 May 2023 17:30)       INTERVAL HPI/OVERNIGHT EVENTS: Patient seen and examined at bedside. Confused at baseline due to dementia     MEDICATIONS  (STANDING):  allopurinol 300 milliGRAM(s) Oral daily  ascorbic acid 500 milliGRAM(s) Oral two times a day  atorvastatin 10 milliGRAM(s) Oral at bedtime  colchicine 0.6 milliGRAM(s) Oral daily  epoetin jane-epbx (RETACRIT) Injectable 82319 Unit(s) SubCutaneous <User Schedule>  ertapenem  IVPB      ertapenem  IVPB 1000 milliGRAM(s) IV Intermittent every 24 hours  famotidine    Tablet 20 milliGRAM(s) Oral daily  melatonin 5 milliGRAM(s) Oral at bedtime  memantine 10 milliGRAM(s) Oral daily  metoprolol succinate  milliGRAM(s) Oral daily  multivitamin 1 Tablet(s) Oral daily  nystatin Powder 1 Application(s) Topical two times a day  QUEtiapine 50 milliGRAM(s) Oral two times a day  QUEtiapine 100 milliGRAM(s) Oral at bedtime  senna 1 Tablet(s) Oral at bedtime  sertraline 50 milliGRAM(s) Oral daily  tamsulosin 0.4 milliGRAM(s) Oral at bedtime    MEDICATIONS  (PRN):  acetaminophen     Tablet .. 650 milliGRAM(s) Oral every 6 hours PRN Mild Pain (1 - 3)  bisacodyl Suppository 10 milliGRAM(s) Rectal daily PRN Constipation  LORazepam     Tablet 0.5 milliGRAM(s) Oral two times a day PRN Agitation  magnesium hydroxide Suspension 30 milliLiter(s) Oral daily PRN Constipation      Allergies    No Known Allergies    Intolerances        REVIEW OF SYSTEMS:  Unable to obtain.  Confused at baseline due to dementia       Vital Signs Last 24 Hrs  T(C): 36.8 (13 May 2023 04:52), Max: 36.8 (13 May 2023 04:52)  T(F): 98.3 (13 May 2023 04:52), Max: 98.3 (13 May 2023 04:52)  HR: 79 (13 May 2023 04:52) (68 - 79)  BP: 147/70 (13 May 2023 04:52) (110/69 - 147/70)  BP(mean): --  RR: 18 (13 May 2023 04:52) (18 - 18)  SpO2: 96% (13 May 2023 04:52) (95% - 96%)    Parameters below as of 13 May 2023 04:52  Patient On (Oxygen Delivery Method): nasal cannula        PHYSICAL EXAM:  GENERAL: NAD, Alert   HEENT:  anicteric, moist mucous membranes  CHEST/LUNG:  CTA b/l, no rales, wheezes, or rhonchi  HEART:  RRR, S1, S2  ABDOMEN:  BS+, soft, nontender, nondistended  EXTREMITIES: no edema, cyanosis, or calf tenderness  NERVOUS SYSTEM: answers questions and follows commands appropriately  PSYCH: normal affect  SKIN: No rashes or lesions    LABS:                        8.8    6.04  )-----------( 174      ( 13 May 2023 05:53 )             28.5     13 May 2023 05:53    139    |  115    |  36     ----------------------------<  92     3.9     |  21     |  1.70     Ca    8.7        13 May 2023 05:53        CAPILLARY BLOOD GLUCOSE        BLOOD CULTURE    RADIOLOGY & ADDITIONAL TESTS:    Imaging Personally Reviewed:  [ ] YES     Consultant(s) Notes Reviewed:      Care Discussed with Consultants/Other Providers:

## 2023-05-13 NOTE — PROGRESS NOTE ADULT - SUBJECTIVE AND OBJECTIVE BOX
[facility], Division of Infectious Diseases  ALMA Sanchez Y. Patel, S. Shah, G. [facility]  [phone]    Name: HILLARY RED  Age: 77y  Gender: Male  MRN: [id number]    Interval History:  Patient seen and examined at bedside   No acute overnight events. Afebrile  Notes reviewed    Antibiotics:  ertapenem  IVPB 1000 milliGRAM(s) IV Intermittent every 24 hours  ertapenem  IVPB          Medications:  acetaminophen     Tablet .. 650 milliGRAM(s) Oral every 6 hours PRN  allopurinol 300 milliGRAM(s) Oral daily  ascorbic acid 500 milliGRAM(s) Oral two times a day  atorvastatin 10 milliGRAM(s) Oral at bedtime  bisacodyl Suppository 10 milliGRAM(s) Rectal daily PRN  colchicine 0.6 milliGRAM(s) Oral daily  epoetin jane-epbx (RETACRIT) Injectable 82656 Unit(s) SubCutaneous <User Schedule>  ertapenem  IVPB      ertapenem  IVPB 1000 milliGRAM(s) IV Intermittent every 24 hours  famotidine    Tablet 20 milliGRAM(s) Oral daily  LORazepam     Tablet 0.5 milliGRAM(s) Oral two times a day PRN  magnesium hydroxide Suspension 30 milliLiter(s) Oral daily PRN  melatonin 5 milliGRAM(s) Oral at bedtime  memantine 10 milliGRAM(s) Oral daily  metoprolol succinate  milliGRAM(s) Oral daily  multivitamin 1 Tablet(s) Oral daily  nystatin Powder 1 Application(s) Topical two times a day  QUEtiapine 50 milliGRAM(s) Oral two times a day  QUEtiapine 100 milliGRAM(s) Oral at bedtime  senna 1 Tablet(s) Oral at bedtime  sertraline 50 milliGRAM(s) Oral daily  tamsulosin 0.4 milliGRAM(s) Oral at bedtime      Review of Systems:  unable to obtain    Allergies: No Known Allergies    For details regarding the patient's past medical history, social history, family history, and other miscellaneous elements, please refer the initial infectious diseases consultation and/or the admitting history and physical examination for this admission.    Objective:  Vitals:   T(C): 36.8 (05-13-23 @ 14:02), Max: 36.8 (05-13-23 @ 04:52)  HR: 61 (05-13-23 @ 14:02) (61 - 79)  BP: 112/65 (05-13-23 @ 14:02) (112/65 - 147/70)  RR: 18 (05-13-23 @ 14:02) (18 - 18)  SpO2: 94% (05-13-23 @ 14:02) (94% - 96%)    Physical Examination:  General: NAD  Neck: supple, trachea midline  Lungs: clear, no wheeze/rhonchi  Cardiovascular: regular rate and rhythm, S1 S2  Abdomen: soft, nontender,  bowel sounds normal  Neurological: Confused  Skin: no rash  Extremities: no edema      Laboratory Studies:  CBC:                       8.8    6.04  )-----------( 174      ( 13 May 2023 05:53 )             28.5     CMP: 05-13    139  |  115<H>  |  36<H>  ----------------------------<  92  3.9   |  21<L>  |  1.70<H>    Ca    8.7      13 May 2023 05:53    TPro  5.1<L>  /  Alb  2.6<L>  /  TBili  0.5  /  DBili  x   /  AST  19  /  ALT  25  /  AlkPhos  60  05-12    LIVER FUNCTIONS - ( 12 May 2023 05:26 )  Alb: 2.6 g/dL / Pro: 5.1 g/dL / ALK PHOS: 60 U/L / ALT: 25 U/L / AST: 19 U/L / GGT: x               Microbiology: reviewed    Culture - Urine (collected 05-03-23 @ 10:40)  Source: Clean Catch Clean Catch (Midstream)  Final Report (05-06-23 @ 09:20):    10,000 - 49,000 CFU/mL Proteus mirabilis ESBL    10,000 - 49,000 CFU/mL Coag Negative Staphylococcus "Susceptibilities not    performed"  Organism: Proteus mirabilis ESBL (05-06-23 @ 09:20)  Organism: Proteus mirabilis ESBL (05-06-23 @ 09:20)      Method Type: STEPHEN      -  Amikacin: S <=16      -  Amoxicillin/Clavulanic Acid: S <=8/4      -  Ampicillin: R >16 These ampicillin results predict results for amoxicillin      -  Ampicillin/Sulbactam: S <=4/2 Enterobacter, Klebsiella aerogenes, Citrobacter, and Serratia may develop resistance during prolonged therapy (3-4 days)      -  Aztreonam: R >16      -  Cefazolin: R >16 For uncomplicated UTI with K. pneumoniae, E. coli, or P. mirablis: STEPHEN <=16 is sensitive and STEPHEN >=32 is resistant. This also predicts results for oral agents cefaclor, cefdinir, cefpodoxime, cefprozil, cefuroxime axetil, cephalexin and locarbef for uncomplicated UTI. Note that some isolates may be susceptible to these agents while testing resistant to cefazolin.      -  Cefepime: R >16      -  Ceftriaxone: R >32 Enterobacter, Klebsiella aerogenes, Citrobacter, and Serratia may develop resistance during prolonged therapy      -  Cefuroxime: R >16      -  Ciprofloxacin: R >2      -  Ertapenem: S <=0.5      -  Gentamicin: R >8      -  Levofloxacin: R >4      -  Meropenem: S <=1      -  Nitrofurantoin: R >64 Should not be used to treat pyelonephritis      -  Piperacillin/Tazobactam: SDD 16      -  Tobramycin: R >8      -  Trimethoprim/Sulfamethoxazole: R >2/38    Culture - Blood (collected 05-03-23 @ 08:30)  Source: .Blood Blood-Peripheral  Final Report (05-08-23 @ 12:00):    No Growth Final    Culture - Blood (collected 05-03-23 @ 08:25)  Source: .Blood Blood-Peripheral  Final Report (05-08-23 @ 12:00):    No Growth Final          Radiology: reviewed

## 2023-05-13 NOTE — PROGRESS NOTE ADULT - SUBJECTIVE AND OBJECTIVE BOX
Neurology follow up note  COVERING DR ROB THORNTON [id number]      Interval History:    Patient feels ok no new complaints.    MEDICATIONS    acetaminophen     Tablet .. 650 milliGRAM(s) Oral every 6 hours PRN  allopurinol 300 milliGRAM(s) Oral daily  ascorbic acid 500 milliGRAM(s) Oral two times a day  atorvastatin 10 milliGRAM(s) Oral at bedtime  bisacodyl Suppository 10 milliGRAM(s) Rectal daily PRN  colchicine 0.6 milliGRAM(s) Oral daily  epoetin jane-epbx (RETACRIT) Injectable 83075 Unit(s) SubCutaneous <User Schedule>  ertapenem  IVPB 1000 milliGRAM(s) IV Intermittent every 24 hours  ertapenem  IVPB      famotidine    Tablet 20 milliGRAM(s) Oral daily  LORazepam     Tablet 0.5 milliGRAM(s) Oral two times a day PRN  magnesium hydroxide Suspension 30 milliLiter(s) Oral daily PRN  melatonin 5 milliGRAM(s) Oral at bedtime  memantine 10 milliGRAM(s) Oral daily  metoprolol succinate  milliGRAM(s) Oral daily  multivitamin 1 Tablet(s) Oral daily  nystatin Powder 1 Application(s) Topical two times a day  QUEtiapine 50 milliGRAM(s) Oral two times a day  QUEtiapine 100 milliGRAM(s) Oral at bedtime  senna 1 Tablet(s) Oral at bedtime  sertraline 50 milliGRAM(s) Oral daily  tamsulosin 0.4 milliGRAM(s) Oral at bedtime      Allergies    No Known Allergies    Intolerances            Vital Signs Last 24 Hrs  T(C): 36.8 (13 May 2023 04:52), Max: 36.8 (13 May 2023 04:52)  T(F): 98.3 (13 May 2023 04:52), Max: 98.3 (13 May 2023 04:52)  HR: 79 (13 May 2023 04:52) (68 - 79)  BP: 147/70 (13 May 2023 04:52) (110/69 - 147/70)  BP(mean): --  RR: 18 (13 May 2023 04:52) (18 - 18)  SpO2: 96% (13 May 2023 04:52) (95% - 96%)    Parameters below as of 13 May 2023 04:52  Patient On (Oxygen Delivery Method): nasal cannula          REVIEW OF SYSTEMS:  Limit or unable to obtain secondary to patient's poor mental status.    Constitutional: No fever, chills, fatigue, weakness  Eyes: no eye pain, visual disturbances, or discharge  ENT:  No difficulty hearing, tinnitus, vertigo; No sinus or throat pain  Neck: No pain or stiffness  Respiratory: No cough, dyspnea, wheezing   Cardiovascular: No chest pain, palpitations,   Gastrointestinal: No abdominal or epigastric pain. No nausea, vomiting  No diarrhea or constipation.   Genitourinary: No dysuria, frequency, hematuria or incontinence  Neurological: No headaches, lightheadedness, vertigo, numbness or tremors  Psychiatric: No depression, anxiety, mood swings or difficulty sleeping  Musculoskeletal: No joint pain or swelling; No muscle, back or extremity pain  Skin: No itching, burning, rashes or lesions   Lymph Nodes: No enlarged glands  Endocrine: No heat or cold intolerance; No hair loss   Allergy and Immunologic: No hives or eczema    On Neurological Examination:    Mental Status - Patient is alert, awake  confused       Follow simple commands    Speech -   Fluent          Cranial Nerves -  extraocular eye movements intact.   smile symmetric  intact bilateral NLF    Motor Exam -   positive movement of all 4 extremities    Muscle tone - is normal all over.  No asymmetry is seen.      Sensory    Bilateral intact to light touch    GENERAL Exam: Nontoxic , No Acute Distress   	  HEENT:  normocephalic, atraumatic  		  LUNGS: Clear bilaterally   	  HEART: Normal S1S2   No murmur RRR        	  GI/ ABDOMEN:  Soft  Non tender    EXTREMITIES:   No Edema  No Clubbing  No Cyanosis   	   SKIN: Normal  No Ecchymosis               LABS:  CBC Full  -  ( 13 May 2023 05:53 )  WBC Count : 6.04 K/uL  RBC Count : 3.08 M/uL  Hemoglobin : 8.8 g/dL  Hematocrit : 28.5 %  Platelet Count - Automated : 174 K/uL  Mean Cell Volume : 92.5 fl  Mean Cell Hemoglobin : 28.6 pg  Mean Cell Hemoglobin Concentration : 30.9 gm/dL  Auto Neutrophil # : x  Auto Lymphocyte # : x  Auto Monocyte # : x  Auto Eosinophil # : x  Auto Basophil # : x  Auto Neutrophil % : x  Auto Lymphocyte % : x  Auto Monocyte % : x  Auto Eosinophil % : x  Auto Basophil % : x      05-13    139  |  115<H>  |  36<H>  ----------------------------<  92  3.9   |  21<L>  |  1.70<H>    Ca    8.7      13 May 2023 05:53    TPro  5.1<L>  /  Alb  2.6<L>  /  TBili  0.5  /  DBili  x   /  AST  19  /  ALT  25  /  AlkPhos  60  05-12    Hemoglobin A1C:     LIVER FUNCTIONS - ( 12 May 2023 05:26 )  Alb: 2.6 g/dL / Pro: 5.1 g/dL / ALK PHOS: 60 U/L / ALT: 25 U/L / AST: 19 U/L / GGT: x           Vitamin B12         RADIOLOGY    ASSESSMENT AND PLAN  Altered in mental status,   For Altered in mental status metabolic encephalopathy, which is multifactorial secondary to underlying infection type process along with  dementia made worse in hospital setting   history of dementia continue home medications   fall precautions safety precautions discussed with staff   The patient's cognitive status for dementia was evaluated to the best of my ability.  Advanced care directives were unable to be discussed do to mental status     Greater than 45 minutes of time was spent with the patient, plan of care, reviewing data, speaking to the family and  50% of the visit was spent counseling and/or coordinating care with multidisciplinary healthcare team

## 2023-05-13 NOTE — SOCIAL WORK PROGRESS NOTE - NSSWPROGRESSNOTE_GEN_ALL_CORE
Per LOS rounds patient is medically ready for Discharge. Sent referral to [facility]. No one is in admissions for the weekend. Will follow up

## 2023-05-13 NOTE — PROGRESS NOTE ADULT - ASSESSMENT
78 y/o PMH dementia, HTN, Afib s/p wachsman 2020, GERD, CKD, anxiety, schizophrenia  admitted for AMS/agitation. Mental status back to baseline- remains confused.  RVP + parainfluenza.   Urine culture with Proteus ESBL. . No fever WBC wnl.   Worsening confusion and agitation ? sec UTI  Remains confused but calm today- started on seroquel    Plan :   Cont Ertapenam x 5 days till 5/14/23  Trend temps and cbc  Asp precautions    I am covering service 5/13-5/14   Infectious Diseases will continue to follow. Please call with any questions.   Marline Newsome M.D.  [facility] Division of Infectious Diseases [phone]

## 2023-05-13 NOTE — PROGRESS NOTE ADULT - ASSESSMENT
78 y/o PMH dementia, HTN, Afib s/p wachsman 2020, GERD, CKD, anxiety, schizophrenia who was admitted for AMS/agitation. Patient calm and cooperative at bedside. sister reports almost back to baseline     A/P:  metabolic encephalopathy   dementia  -+parainfluenza virus : Symptomatic care     - UC showing ESBL proteus. Patient with recent admission at [facility] for UTI/infective stone s/p ureteral stents     - BC NGTD     - continue Invanz for ESBL UTI per ID, last dose 05/14/2023     - psych saw patient. Continue on Seroquel    - Neuro Dr. Ortiz     - Off of 1:1 observation. Mentals status stable       afib     - cont metoprolol      acute on chronic renal disease    - h/o renal osteodystrophy     - cont BMP. avoid nephrotoxic medications     - Cr stable.     anemia    - hgb stable. continue to monitor    - no signs of active bleeding. continue retacrit.     HLD    - cont pravastatin     DVT proph: SCDs    D/w wife at bedside, Mrs Torres, she came to visit him. Updated her about the plan

## 2023-05-14 LAB
ANION GAP SERPL CALC-SCNC: 3 MMOL/L — LOW (ref 5–17)
BUN SERPL-MCNC: 33 MG/DL — HIGH (ref 7–23)
CALCIUM SERPL-MCNC: 8.7 MG/DL — SIGNIFICANT CHANGE UP (ref 8.5–10.1)
CHLORIDE SERPL-SCNC: 115 MMOL/L — HIGH (ref 96–108)
CO2 SERPL-SCNC: 24 MMOL/L — SIGNIFICANT CHANGE UP (ref 22–31)
CREAT SERPL-MCNC: 1.6 MG/DL — HIGH (ref 0.5–1.3)
EGFR: 44 ML/MIN/1.73M2 — LOW
GLUCOSE SERPL-MCNC: 87 MG/DL — SIGNIFICANT CHANGE UP (ref 70–99)
POTASSIUM SERPL-MCNC: 3.7 MMOL/L — SIGNIFICANT CHANGE UP (ref 3.5–5.3)
POTASSIUM SERPL-SCNC: 3.7 MMOL/L — SIGNIFICANT CHANGE UP (ref 3.5–5.3)
SODIUM SERPL-SCNC: 142 MMOL/L — SIGNIFICANT CHANGE UP (ref 135–145)

## 2023-05-14 PROCEDURE — 99233 SBSQ HOSP IP/OBS HIGH 50: CPT

## 2023-05-14 RX ADMIN — Medication 5 MILLIGRAM(S): at 21:37

## 2023-05-14 RX ADMIN — Medication 300 MILLIGRAM(S): at 12:53

## 2023-05-14 RX ADMIN — Medication 500 MILLIGRAM(S): at 17:20

## 2023-05-14 RX ADMIN — Medication 100 MILLIGRAM(S): at 06:10

## 2023-05-14 RX ADMIN — QUETIAPINE FUMARATE 100 MILLIGRAM(S): 200 TABLET, FILM COATED ORAL at 21:38

## 2023-05-14 RX ADMIN — Medication 1 TABLET(S): at 12:53

## 2023-05-14 RX ADMIN — QUETIAPINE FUMARATE 50 MILLIGRAM(S): 200 TABLET, FILM COATED ORAL at 17:20

## 2023-05-14 RX ADMIN — SERTRALINE 50 MILLIGRAM(S): 25 TABLET, FILM COATED ORAL at 12:53

## 2023-05-14 RX ADMIN — NYSTATIN CREAM 1 APPLICATION(S): 100000 CREAM TOPICAL at 18:20

## 2023-05-14 RX ADMIN — Medication 0.6 MILLIGRAM(S): at 12:53

## 2023-05-14 RX ADMIN — Medication 500 MILLIGRAM(S): at 06:10

## 2023-05-14 RX ADMIN — TAMSULOSIN HYDROCHLORIDE 0.4 MILLIGRAM(S): 0.4 CAPSULE ORAL at 21:37

## 2023-05-14 RX ADMIN — NYSTATIN CREAM 1 APPLICATION(S): 100000 CREAM TOPICAL at 06:10

## 2023-05-14 RX ADMIN — FAMOTIDINE 20 MILLIGRAM(S): 10 INJECTION INTRAVENOUS at 12:54

## 2023-05-14 RX ADMIN — ERTAPENEM SODIUM 120 MILLIGRAM(S): 1 INJECTION, POWDER, LYOPHILIZED, FOR SOLUTION INTRAMUSCULAR; INTRAVENOUS at 13:41

## 2023-05-14 RX ADMIN — QUETIAPINE FUMARATE 50 MILLIGRAM(S): 200 TABLET, FILM COATED ORAL at 06:10

## 2023-05-14 RX ADMIN — MEMANTINE HYDROCHLORIDE 10 MILLIGRAM(S): 10 TABLET ORAL at 12:54

## 2023-05-14 RX ADMIN — ATORVASTATIN CALCIUM 10 MILLIGRAM(S): 80 TABLET, FILM COATED ORAL at 21:38

## 2023-05-14 NOTE — PROGRESS NOTE ADULT - TIME BILLING
Note written by attending. Meds, labs, vitals, chart reviewed. D/w RN Note written by attending. Meds, labs, vitals, chart reviewed. D/w RN. I called wife Miss Torres and updated her

## 2023-05-14 NOTE — PROGRESS NOTE ADULT - ASSESSMENT
78 y/o PMH dementia, HTN, Afib s/p wachsman 2020, GERD, CKD, anxiety, schizophrenia who was admitted for AMS/agitation. Patient calm and cooperative at bedside. sister reports almost back to baseline     A/P:  metabolic encephalopathy   dementia  -+parainfluenza virus : Symptomatic care     - UC showing ESBL proteus. Patient with recent admission at [facility] for UTI/infective stone s/p ureteral stents     - BC NGTD     - continue Invanz for ESBL UTI per ID, last dose 05/14/2023     - psych saw patient. Continue on Seroquel    - Neuro Dr. Ortiz     - Off of 1:1 observation. Mentals status stable     - D/c planning to [facility] in progress, once completes course of IV Invanz       afib     - cont metoprolol      acute on chronic renal disease    - h/o renal osteodystrophy     - cont BMP. avoid nephrotoxic medications     - Cr stable.     anemia    - hgb stable. continue to monitor    - no signs of active bleeding. continue retacrit.     HLD    - cont pravastatin     DVT proph: SCDs    D/w wife at bedside, Mrs Torres, she came to visit him. Updated her about the plan

## 2023-05-14 NOTE — PROGRESS NOTE ADULT - SUBJECTIVE AND OBJECTIVE BOX
Neurology follow up note  COVERING DR ROB NAVARROEFF77yMale      Interval History:    Patient feels ok no new complaints.    Allergies    No Known Allergies    Intolerances        MEDICATIONS    acetaminophen     Tablet .. 650 milliGRAM(s) Oral every 6 hours PRN  allopurinol 300 milliGRAM(s) Oral daily  ascorbic acid 500 milliGRAM(s) Oral two times a day  atorvastatin 10 milliGRAM(s) Oral at bedtime  bisacodyl Suppository 10 milliGRAM(s) Rectal daily PRN  colchicine 0.6 milliGRAM(s) Oral daily  epoetin jane-epbx (RETACRIT) Injectable 74057 Unit(s) SubCutaneous <User Schedule>  ertapenem  IVPB 1000 milliGRAM(s) IV Intermittent every 24 hours  ertapenem  IVPB      famotidine    Tablet 20 milliGRAM(s) Oral daily  LORazepam     Tablet 0.5 milliGRAM(s) Oral two times a day PRN  magnesium hydroxide Suspension 30 milliLiter(s) Oral daily PRN  melatonin 5 milliGRAM(s) Oral at bedtime  memantine 10 milliGRAM(s) Oral daily  metoprolol succinate  milliGRAM(s) Oral daily  multivitamin 1 Tablet(s) Oral daily  nystatin Powder 1 Application(s) Topical two times a day  QUEtiapine 50 milliGRAM(s) Oral two times a day  QUEtiapine 100 milliGRAM(s) Oral at bedtime  senna 1 Tablet(s) Oral at bedtime  sertraline 50 milliGRAM(s) Oral daily  tamsulosin 0.4 milliGRAM(s) Oral at bedtime              Vital Signs Last 24 Hrs  T(C): 36.3 (14 May 2023 05:25), Max: 36.8 (13 May 2023 14:02)  T(F): 97.4 (14 May 2023 05:25), Max: 98.2 (13 May 2023 14:02)  HR: 65 (14 May 2023 05:25) (61 - 78)  BP: 138/71 (14 May 2023 05:25) (112/65 - 138/71)  BP(mean): --  RR: 19 (14 May 2023 05:25) (18 - 19)  SpO2: 95% (14 May 2023 05:25) (94% - 95%)    Parameters below as of 14 May 2023 05:25  Patient On (Oxygen Delivery Method): room air          REVIEW OF SYSTEMS:  Limit or unable to obtain secondary to patient's poor mental status.    Constitutional: No fever, chills, fatigue, weakness  Eyes: no eye pain, visual disturbances, or discharge  ENT:  No difficulty hearing, tinnitus, vertigo; No sinus or throat pain  Neck: No pain or stiffness  Respiratory: No cough, dyspnea, wheezing   Cardiovascular: No chest pain, palpitations,   Gastrointestinal: No abdominal or epigastric pain. No nausea, vomiting  No diarrhea or constipation.   Genitourinary: No dysuria, frequency, hematuria or incontinence  Neurological: No headaches, lightheadedness, vertigo, numbness or tremors  Psychiatric: No depression, anxiety, mood swings or difficulty sleeping  Musculoskeletal: No joint pain or swelling; No muscle, back or extremity pain  Skin: No itching, burning, rashes or lesions   Lymph Nodes: No enlarged glands  Endocrine: No heat or cold intolerance; No hair loss   Allergy and Immunologic: No hives or eczema    On Neurological Examination:    Mental Status - Patient is alert, awake  confused       Follow simple commands    Speech -   Fluent          Cranial Nerves -  extraocular eye movements intact.   smile symmetric  intact bilateral NLF    Motor Exam -   positive movement of all 4 extremities    Muscle tone - is normal all over.  No asymmetry is seen.      Sensory    Bilateral intact to light touch    GENERAL Exam: Nontoxic , No Acute Distress   	  HEENT:  normocephalic, atraumatic  		  LUNGS: Clear bilaterally   	  HEART: Normal S1S2   No murmur RRR        	  GI/ ABDOMEN:  Soft  Non tender    EXTREMITIES:   No Edema  No Clubbing  No Cyanosis   	   SKIN: Normal  No Ecchymosis          LABS:  CBC Full  -  ( 13 May 2023 05:53 )  WBC Count : 6.04 K/uL  RBC Count : 3.08 M/uL  Hemoglobin : 8.8 g/dL  Hematocrit : 28.5 %  Platelet Count - Automated : 174 K/uL  Mean Cell Volume : 92.5 fl  Mean Cell Hemoglobin : 28.6 pg  Mean Cell Hemoglobin Concentration : 30.9 gm/dL  Auto Neutrophil # : x  Auto Lymphocyte # : x  Auto Monocyte # : x  Auto Eosinophil # : x  Auto Basophil # : x  Auto Neutrophil % : x  Auto Lymphocyte % : x  Auto Monocyte % : x  Auto Eosinophil % : x  Auto Basophil % : x      05-13    139  |  115<H>  |  36<H>  ----------------------------<  92  3.9   |  21<L>  |  1.70<H>    Ca    8.7      13 May 2023 05:53      Hemoglobin A1C:       Vitamin B12         RADIOLOGY    ASSESSMENT AND PLAN  Altered in mental status,   For Altered in mental status metabolic encephalopathy, which is multifactorial secondary to underlying infection type process along with  dementia made worse in hospital setting   history of dementia continue home medications   no new events     Greater than 40 minutes of time was spent with the patient, plan of care, reviewing data, speaking to the family and  50% of the visit was spent counseling and/or coordinating care with multidisciplinary healthcare team     Neurology follow up note  COVERING DR ROB NAVARROEFF77yMale      Interval History:    Patient feels ok no new complaints.    Allergies    No Known Allergies    Intolerances        MEDICATIONS    acetaminophen     Tablet .. 650 milliGRAM(s) Oral every 6 hours PRN  allopurinol 300 milliGRAM(s) Oral daily  ascorbic acid 500 milliGRAM(s) Oral two times a day  atorvastatin 10 milliGRAM(s) Oral at bedtime  bisacodyl Suppository 10 milliGRAM(s) Rectal daily PRN  colchicine 0.6 milliGRAM(s) Oral daily  epoetin jane-epbx (RETACRIT) Injectable 63858 Unit(s) SubCutaneous <User Schedule>  ertapenem  IVPB 1000 milliGRAM(s) IV Intermittent every 24 hours  ertapenem  IVPB      famotidine    Tablet 20 milliGRAM(s) Oral daily  LORazepam     Tablet 0.5 milliGRAM(s) Oral two times a day PRN  magnesium hydroxide Suspension 30 milliLiter(s) Oral daily PRN  melatonin 5 milliGRAM(s) Oral at bedtime  memantine 10 milliGRAM(s) Oral daily  metoprolol succinate  milliGRAM(s) Oral daily  multivitamin 1 Tablet(s) Oral daily  nystatin Powder 1 Application(s) Topical two times a day  QUEtiapine 50 milliGRAM(s) Oral two times a day  QUEtiapine 100 milliGRAM(s) Oral at bedtime  senna 1 Tablet(s) Oral at bedtime  sertraline 50 milliGRAM(s) Oral daily  tamsulosin 0.4 milliGRAM(s) Oral at bedtime              Vital Signs Last 24 Hrs  T(C): 36.3 (14 May 2023 05:25), Max: 36.8 (13 May 2023 14:02)  T(F): 97.4 (14 May 2023 05:25), Max: 98.2 (13 May 2023 14:02)  HR: 65 (14 May 2023 05:25) (61 - 78)  BP: 138/71 (14 May 2023 05:25) (112/65 - 138/71)  BP(mean): --  RR: 19 (14 May 2023 05:25) (18 - 19)  SpO2: 95% (14 May 2023 05:25) (94% - 95%)    Parameters below as of 14 May 2023 05:25  Patient On (Oxygen Delivery Method): room air          REVIEW OF SYSTEMS:  Limit or unable to obtain secondary to patient's poor mental status.    Constitutional: No fever, chills, fatigue, weakness  Eyes: no eye pain, visual disturbances, or discharge  ENT:  No difficulty hearing, tinnitus, vertigo; No sinus or throat pain  Neck: No pain or stiffness  Respiratory: No cough, dyspnea, wheezing   Cardiovascular: No chest pain, palpitations,   Gastrointestinal: No abdominal or epigastric pain. No nausea, vomiting  No diarrhea or constipation.   Genitourinary: No dysuria, frequency, hematuria or incontinence  Neurological: No headaches, lightheadedness, vertigo, numbness or tremors  Psychiatric: No depression, anxiety, mood swings or difficulty sleeping  Musculoskeletal: No joint pain or swelling; No muscle, back or extremity pain  Skin: No itching, burning, rashes or lesions   Lymph Nodes: No enlarged glands  Endocrine: No heat or cold intolerance; No hair loss   Allergy and Immunologic: No hives or eczema    On Neurological Examination:    Mental Status - Patient is alert, awake  confused       Follow simple commands    Speech -   Fluent          Cranial Nerves -  extraocular eye movements intact.   smile symmetric  intact bilateral NLF    Motor Exam -   positive movement of all 4 extremities    Muscle tone - is normal all over.  No asymmetry is seen.      Sensory    Bilateral intact to light touch    GENERAL Exam: Nontoxic , No Acute Distress   	  HEENT:  normocephalic, atraumatic  		  LUNGS: Clear bilaterally   	  HEART: Normal S1S2   No murmur RRR        	  GI/ ABDOMEN:  Soft  Non tender    EXTREMITIES:   No Edema  No Clubbing  No Cyanosis   	   SKIN: Normal  No Ecchymosis          LABS:  CBC Full  -  ( 13 May 2023 05:53 )  WBC Count : 6.04 K/uL  RBC Count : 3.08 M/uL  Hemoglobin : 8.8 g/dL  Hematocrit : 28.5 %  Platelet Count - Automated : 174 K/uL  Mean Cell Volume : 92.5 fl  Mean Cell Hemoglobin : 28.6 pg  Mean Cell Hemoglobin Concentration : 30.9 gm/dL  Auto Neutrophil # : x  Auto Lymphocyte # : x  Auto Monocyte # : x  Auto Eosinophil # : x  Auto Basophil # : x  Auto Neutrophil % : x  Auto Lymphocyte % : x  Auto Monocyte % : x  Auto Eosinophil % : x  Auto Basophil % : x      05-13    139  |  115<H>  |  36<H>  ----------------------------<  92  3.9   |  21<L>  |  1.70<H>    Ca    8.7      13 May 2023 05:53      Hemoglobin A1C:       Vitamin B12         RADIOLOGY    ASSESSMENT AND PLAN  Altered in mental status,   For Altered in mental status metabolic encephalopathy, which is multifactorial secondary to underlying infection type process along with  dementia made worse in hospital setting   history of dementia continue home medications   no new events     Greater than 27 minutes of time was spent with the patient, plan of care, reviewing data, speaking to the family and  50% of the visit was spent counseling and/or coordinating care with multidisciplinary healthcare team

## 2023-05-14 NOTE — PROGRESS NOTE ADULT - SUBJECTIVE AND OBJECTIVE BOX
Patient is a 77y old  Male who presents with a chief complaint of agitation, CKD, anemia (14 May 2023 06:42)       INTERVAL HPI/OVERNIGHT EVENTS: Patient seen and examined at bedside. Alert, no new events. Confused at baseline     MEDICATIONS  (STANDING):  allopurinol 300 milliGRAM(s) Oral daily  ascorbic acid 500 milliGRAM(s) Oral two times a day  atorvastatin 10 milliGRAM(s) Oral at bedtime  colchicine 0.6 milliGRAM(s) Oral daily  epoetin jane-epbx (RETACRIT) Injectable 84071 Unit(s) SubCutaneous <User Schedule>  ertapenem  IVPB 1000 milliGRAM(s) IV Intermittent every 24 hours  ertapenem  IVPB      famotidine    Tablet 20 milliGRAM(s) Oral daily  melatonin 5 milliGRAM(s) Oral at bedtime  memantine 10 milliGRAM(s) Oral daily  metoprolol succinate  milliGRAM(s) Oral daily  multivitamin 1 Tablet(s) Oral daily  nystatin Powder 1 Application(s) Topical two times a day  QUEtiapine 100 milliGRAM(s) Oral at bedtime  QUEtiapine 50 milliGRAM(s) Oral two times a day  senna 1 Tablet(s) Oral at bedtime  sertraline 50 milliGRAM(s) Oral daily  tamsulosin 0.4 milliGRAM(s) Oral at bedtime    MEDICATIONS  (PRN):  acetaminophen     Tablet .. 650 milliGRAM(s) Oral every 6 hours PRN Mild Pain (1 - 3)  bisacodyl Suppository 10 milliGRAM(s) Rectal daily PRN Constipation  LORazepam     Tablet 0.5 milliGRAM(s) Oral two times a day PRN Agitation  magnesium hydroxide Suspension 30 milliLiter(s) Oral daily PRN Constipation      Allergies    No Known Allergies    Intolerances        REVIEW OF SYSTEMS:  Unable to obtain. Confused at baseline   Vital Signs Last 24 Hrs  T(C): 36.3 (14 May 2023 05:25), Max: 36.8 (13 May 2023 14:02)  T(F): 97.4 (14 May 2023 05:25), Max: 98.2 (13 May 2023 14:02)  HR: 65 (14 May 2023 05:25) (61 - 78)  BP: 138/71 (14 May 2023 05:25) (112/65 - 138/71)  BP(mean): --  RR: 19 (14 May 2023 05:25) (18 - 19)  SpO2: 95% (14 May 2023 05:25) (94% - 95%)    Parameters below as of 14 May 2023 05:25  Patient On (Oxygen Delivery Method): room air        PHYSICAL EXAM:  GENERAL: NAD, Alert, confused   HEENT:  anicteric, moist mucous membranes  CHEST/LUNG:  CTA b/l, no rales, wheezes, or rhonchi  HEART:  RRR, S1, S2  ABDOMEN:  BS+, soft, nontender, nondistended  EXTREMITIES: no edema, cyanosis, or calf tenderness  NERVOUS SYSTEM: answers questions and follows commands appropriately  PSYCH: normal affect  SKIN: No rashes or lesions    LABS:    14 May 2023 06:03    142    |  115    |  33     ----------------------------<  87     3.7     |  24     |  1.60     Ca    8.7        14 May 2023 06:03        CAPILLARY BLOOD GLUCOSE        BLOOD CULTURE    RADIOLOGY & ADDITIONAL TESTS:    Imaging Personally Reviewed:  [ ] YES     Consultant(s) Notes Reviewed:      Care Discussed with Consultants/Other Providers:

## 2023-05-15 ENCOUNTER — TRANSCRIPTION ENCOUNTER (OUTPATIENT)
Age: 78
End: 2023-05-15

## 2023-05-15 VITALS
SYSTOLIC BLOOD PRESSURE: 117 MMHG | RESPIRATION RATE: 18 BRPM | DIASTOLIC BLOOD PRESSURE: 67 MMHG | HEART RATE: 81 BPM | OXYGEN SATURATION: 96 % | TEMPERATURE: 98 F

## 2023-05-15 LAB
ANION GAP SERPL CALC-SCNC: 3 MMOL/L — LOW (ref 5–17)
BUN SERPL-MCNC: 39 MG/DL — HIGH (ref 7–23)
CALCIUM SERPL-MCNC: 8.7 MG/DL — SIGNIFICANT CHANGE UP (ref 8.5–10.1)
CHLORIDE SERPL-SCNC: 118 MMOL/L — HIGH (ref 96–108)
CO2 SERPL-SCNC: 23 MMOL/L — SIGNIFICANT CHANGE UP (ref 22–31)
CREAT SERPL-MCNC: 1.9 MG/DL — HIGH (ref 0.5–1.3)
EGFR: 36 ML/MIN/1.73M2 — LOW
GLUCOSE SERPL-MCNC: 99 MG/DL — SIGNIFICANT CHANGE UP (ref 70–99)
HCT VFR BLD CALC: 31.8 % — LOW (ref 39–50)
HGB BLD-MCNC: 9.7 G/DL — LOW (ref 13–17)
MCHC RBC-ENTMCNC: 28.4 PG — SIGNIFICANT CHANGE UP (ref 27–34)
MCHC RBC-ENTMCNC: 30.5 GM/DL — LOW (ref 32–36)
MCV RBC AUTO: 93.3 FL — SIGNIFICANT CHANGE UP (ref 80–100)
NRBC # BLD: 0 /100 WBCS — SIGNIFICANT CHANGE UP (ref 0–0)
PLATELET # BLD AUTO: 167 K/UL — SIGNIFICANT CHANGE UP (ref 150–400)
POTASSIUM SERPL-MCNC: 4 MMOL/L — SIGNIFICANT CHANGE UP (ref 3.5–5.3)
POTASSIUM SERPL-SCNC: 4 MMOL/L — SIGNIFICANT CHANGE UP (ref 3.5–5.3)
RBC # BLD: 3.41 M/UL — LOW (ref 4.2–5.8)
RBC # FLD: 16.4 % — HIGH (ref 10.3–14.5)
SARS-COV-2 RNA SPEC QL NAA+PROBE: SIGNIFICANT CHANGE UP
SODIUM SERPL-SCNC: 144 MMOL/L — SIGNIFICANT CHANGE UP (ref 135–145)
WBC # BLD: 6.94 K/UL — SIGNIFICANT CHANGE UP (ref 3.8–10.5)
WBC # FLD AUTO: 6.94 K/UL — SIGNIFICANT CHANGE UP (ref 3.8–10.5)

## 2023-05-15 PROCEDURE — 86803 HEPATITIS C AB TEST: CPT

## 2023-05-15 PROCEDURE — 92610 EVALUATE SWALLOWING FUNCTION: CPT

## 2023-05-15 PROCEDURE — 84466 ASSAY OF TRANSFERRIN: CPT

## 2023-05-15 PROCEDURE — 85610 PROTHROMBIN TIME: CPT

## 2023-05-15 PROCEDURE — 86850 RBC ANTIBODY SCREEN: CPT

## 2023-05-15 PROCEDURE — 83550 IRON BINDING TEST: CPT

## 2023-05-15 PROCEDURE — 99285 EMERGENCY DEPT VISIT HI MDM: CPT

## 2023-05-15 PROCEDURE — 93005 ELECTROCARDIOGRAM TRACING: CPT

## 2023-05-15 PROCEDURE — 86900 BLOOD TYPING SEROLOGIC ABO: CPT

## 2023-05-15 PROCEDURE — 84145 PROCALCITONIN (PCT): CPT

## 2023-05-15 PROCEDURE — 82962 GLUCOSE BLOOD TEST: CPT

## 2023-05-15 PROCEDURE — 0225U NFCT DS DNA&RNA 21 SARSCOV2: CPT

## 2023-05-15 PROCEDURE — 87040 BLOOD CULTURE FOR BACTERIA: CPT

## 2023-05-15 PROCEDURE — 71045 X-RAY EXAM CHEST 1 VIEW: CPT

## 2023-05-15 PROCEDURE — 70450 CT HEAD/BRAIN W/O DYE: CPT | Mod: MA

## 2023-05-15 PROCEDURE — 83605 ASSAY OF LACTIC ACID: CPT

## 2023-05-15 PROCEDURE — 87186 SC STD MICRODIL/AGAR DIL: CPT

## 2023-05-15 PROCEDURE — 86901 BLOOD TYPING SEROLOGIC RH(D): CPT

## 2023-05-15 PROCEDURE — 97530 THERAPEUTIC ACTIVITIES: CPT

## 2023-05-15 PROCEDURE — 85045 AUTOMATED RETICULOCYTE COUNT: CPT

## 2023-05-15 PROCEDURE — 87086 URINE CULTURE/COLONY COUNT: CPT

## 2023-05-15 PROCEDURE — 36415 COLL VENOUS BLD VENIPUNCTURE: CPT

## 2023-05-15 PROCEDURE — 87077 CULTURE AEROBIC IDENTIFY: CPT

## 2023-05-15 PROCEDURE — 82607 VITAMIN B-12: CPT

## 2023-05-15 PROCEDURE — 85027 COMPLETE CBC AUTOMATED: CPT

## 2023-05-15 PROCEDURE — 99233 SBSQ HOSP IP/OBS HIGH 50: CPT

## 2023-05-15 PROCEDURE — 85025 COMPLETE CBC W/AUTO DIFF WBC: CPT

## 2023-05-15 PROCEDURE — 82272 OCCULT BLD FECES 1-3 TESTS: CPT

## 2023-05-15 PROCEDURE — 83540 ASSAY OF IRON: CPT

## 2023-05-15 PROCEDURE — 82728 ASSAY OF FERRITIN: CPT

## 2023-05-15 PROCEDURE — 82746 ASSAY OF FOLIC ACID SERUM: CPT

## 2023-05-15 PROCEDURE — 86140 C-REACTIVE PROTEIN: CPT

## 2023-05-15 PROCEDURE — 97116 GAIT TRAINING THERAPY: CPT

## 2023-05-15 PROCEDURE — 80048 BASIC METABOLIC PNL TOTAL CA: CPT

## 2023-05-15 PROCEDURE — 87635 SARS-COV-2 COVID-19 AMP PRB: CPT

## 2023-05-15 PROCEDURE — 80053 COMPREHEN METABOLIC PANEL: CPT

## 2023-05-15 PROCEDURE — 81001 URINALYSIS AUTO W/SCOPE: CPT

## 2023-05-15 PROCEDURE — 97161 PT EVAL LOW COMPLEX 20 MIN: CPT

## 2023-05-15 PROCEDURE — 85730 THROMBOPLASTIN TIME PARTIAL: CPT

## 2023-05-15 RX ORDER — QUETIAPINE FUMARATE 200 MG/1
1 TABLET, FILM COATED ORAL
Qty: 0 | Refills: 0 | DISCHARGE
Start: 2023-05-15

## 2023-05-15 RX ORDER — CLOTRIMAZOLE AND BETAMETHASONE DIPROPIONATE 10; .5 MG/G; MG/G
1 CREAM TOPICAL
Refills: 0 | DISCHARGE

## 2023-05-15 RX ORDER — ALPRAZOLAM 0.25 MG
1 TABLET ORAL
Refills: 0 | DISCHARGE

## 2023-05-15 RX ORDER — RISPERIDONE 4 MG/1
1 TABLET ORAL
Refills: 0 | DISCHARGE

## 2023-05-15 RX ADMIN — Medication 0.5 MILLIGRAM(S): at 10:45

## 2023-05-15 RX ADMIN — MEMANTINE HYDROCHLORIDE 10 MILLIGRAM(S): 10 TABLET ORAL at 14:07

## 2023-05-15 RX ADMIN — QUETIAPINE FUMARATE 50 MILLIGRAM(S): 200 TABLET, FILM COATED ORAL at 06:06

## 2023-05-15 RX ADMIN — NYSTATIN CREAM 1 APPLICATION(S): 100000 CREAM TOPICAL at 06:07

## 2023-05-15 RX ADMIN — FAMOTIDINE 20 MILLIGRAM(S): 10 INJECTION INTRAVENOUS at 14:07

## 2023-05-15 RX ADMIN — SERTRALINE 50 MILLIGRAM(S): 25 TABLET, FILM COATED ORAL at 14:07

## 2023-05-15 RX ADMIN — Medication 1 TABLET(S): at 14:07

## 2023-05-15 RX ADMIN — Medication 0.6 MILLIGRAM(S): at 14:08

## 2023-05-15 RX ADMIN — QUETIAPINE FUMARATE 50 MILLIGRAM(S): 200 TABLET, FILM COATED ORAL at 17:49

## 2023-05-15 RX ADMIN — ERYTHROPOIETIN 10000 UNIT(S): 10000 INJECTION, SOLUTION INTRAVENOUS; SUBCUTANEOUS at 17:50

## 2023-05-15 RX ADMIN — NYSTATIN CREAM 1 APPLICATION(S): 100000 CREAM TOPICAL at 17:45

## 2023-05-15 RX ADMIN — Medication 100 MILLIGRAM(S): at 06:06

## 2023-05-15 RX ADMIN — Medication 500 MILLIGRAM(S): at 17:49

## 2023-05-15 RX ADMIN — Medication 500 MILLIGRAM(S): at 06:07

## 2023-05-15 RX ADMIN — Medication 300 MILLIGRAM(S): at 14:07

## 2023-05-15 NOTE — PROGRESS NOTE ADULT - SUBJECTIVE AND OBJECTIVE BOX
Patient is a 77y old  Male who presents with a chief complaint of agitation, CKD, anemia (14 May 2023 08:20)      INTERVAL HPI/OVERNIGHT EVENTS: Patient seen and examined at bedside. No new events noted. Confused at baseline    MEDICATIONS  (STANDING):  allopurinol 300 milliGRAM(s) Oral daily  ascorbic acid 500 milliGRAM(s) Oral two times a day  atorvastatin 10 milliGRAM(s) Oral at bedtime  colchicine 0.6 milliGRAM(s) Oral daily  epoetin jane-epbx (RETACRIT) Injectable 02594 Unit(s) SubCutaneous <User Schedule>  famotidine    Tablet 20 milliGRAM(s) Oral daily  melatonin 5 milliGRAM(s) Oral at bedtime  memantine 10 milliGRAM(s) Oral daily  metoprolol succinate  milliGRAM(s) Oral daily  multivitamin 1 Tablet(s) Oral daily  nystatin Powder 1 Application(s) Topical two times a day  QUEtiapine 50 milliGRAM(s) Oral two times a day  QUEtiapine 100 milliGRAM(s) Oral at bedtime  senna 1 Tablet(s) Oral at bedtime  sertraline 50 milliGRAM(s) Oral daily  tamsulosin 0.4 milliGRAM(s) Oral at bedtime    MEDICATIONS  (PRN):  acetaminophen     Tablet .. 650 milliGRAM(s) Oral every 6 hours PRN Mild Pain (1 - 3)  bisacodyl Suppository 10 milliGRAM(s) Rectal daily PRN Constipation  LORazepam     Tablet 0.5 milliGRAM(s) Oral two times a day PRN Agitation  magnesium hydroxide Suspension 30 milliLiter(s) Oral daily PRN Constipation      Allergies    No Known Allergies    Intolerances        REVIEW OF SYSTEMS:  Confused at baseline due to dementia   Vital Signs Last 24 Hrs  T(C): 36.6 (15 May 2023 05:29), Max: 36.6 (14 May 2023 13:34)  T(F): 97.8 (15 May 2023 05:29), Max: 97.9 (14 May 2023 13:34)  HR: 70 (15 May 2023 05:29) (64 - 70)  BP: 124/70 (15 May 2023 05:29) (111/74 - 132/77)  BP(mean): --  RR: 18 (15 May 2023 05:29) (18 - 19)  SpO2: 96% (15 May 2023 05:29) (96% - 98%)    Parameters below as of 15 May 2023 05:29  Patient On (Oxygen Delivery Method): room air        PHYSICAL EXAM:  GENERAL: NAD, Alert, confused   HEENT:  anicteric, moist mucous membranes  CHEST/LUNG:  CTA b/l, no rales, wheezes, or rhonchi  HEART:  RRR, S1, S2  ABDOMEN:  BS+, soft, nontender, nondistended  EXTREMITIES: no edema, cyanosis, or calf tenderness  NERVOUS SYSTEM: answers questions and follows commands appropriately  PSYCH: normal affect  SKIN: No rashes or lesions  LABS:                        9.7    6.94  )-----------( 167      ( 15 May 2023 05:59 )             31.8     15 May 2023 05:59    144    |  118    |  39     ----------------------------<  99     4.0     |  23     |  1.90     Ca    8.7        15 May 2023 05:59        CAPILLARY BLOOD GLUCOSE        BLOOD CULTURE    RADIOLOGY & ADDITIONAL TESTS:    Imaging Personally Reviewed:  [ ] YES     Consultant(s) Notes Reviewed:      Care Discussed with Consultants/Other Providers:

## 2023-05-15 NOTE — PROGRESS NOTE ADULT - PROVIDER SPECIALTY LIST ADULT
Hospitalist
Infectious Disease
Neurology
Neurology
Infectious Disease
Internal Medicine
Neurology
Hospitalist
Hospitalist
Infectious Disease
Infectious Disease
Internal Medicine
Nephrology
Neurology
Hospitalist
Internal Medicine
Nephrology
Nephrology
Hospitalist
Internal Medicine
Hospitalist

## 2023-05-15 NOTE — PROGRESS NOTE ADULT - ASSESSMENT
CKD 3, Prerenal azotemia  Anemia  Dementia  Hypertension    Renal indices stable. To continue current meds. Monitor h/h trend. Transfuse PRBC's PRN.   Monitor BP trend. Titrate BP meds as needed. D/c planning. Monitor labs as out pt.

## 2023-05-15 NOTE — PROGRESS NOTE ADULT - SUBJECTIVE AND OBJECTIVE BOX
Neurology Follow up note    HILLARY NAVARROEFF77yMale    HPI:  77-year-old white male with history of dementia hypertension atrial fibrillation s/p Watchman 2020 anemia BPH hyperlipidemia GERD gout schizophrenia anxiety disorder CKD as well as renal osteodystrophy sent to the emergency department here at [facility] today for evaluation of "altered mental status".  Per transfer papers from [facility] as well as from questioning EMS who transported patient here patient has been more aggressive in terms of behavior and they felt he may be in danger to himself and potentially others.  Wife got called by the facility this AM and heard pt screaming "everyone is dead here" and threatening to call the police. Niece at bedside also reports accounts over the wknd of pt undressing himself including his diaper and smearing feces around the facility. Family reports ICU admission at [facility] march 2023 2/2 sepsis. They also recount rapid and progressive decline in health and hygiene w/>70lb weight loss in recent months and worsening dementia. No additional subjective history is available at this time secondary to patient's baseline dementia.  Pt is currently AAOX1 to self and tearful saying he is going to die and asking for his wife.    In the ED,  Vitals: 97.9F rectal, HR 90 bpm, NIBP 117/80, RR 18 breaths/min, SpO2 100% on RA  labs significant for hgb 8.6, Cr 2.3  UA: neg for UTI  + parainfluenza  s/p 2.4L LR bolus  CT head non con: Anterior parasellar aneurysm coil. No acute hemorrhage, extra-axial collection or mass effect. Bilateral maxillary sinus air-fluid levels.   CXR: No acute finding or change.  ECG: AFib 78ms, Qtc 461ms     (03 May 2023 15:11)      Interval History -no new events    Patient is seen, chart was reviewed and case was discussed with the treatment team.  Pt is not in any distress.   Lying on bed comfortably.         Vital Signs Last 24 Hrs  T(C): 36.6 (15 May 2023 05:29), Max: 36.6 (14 May 2023 13:34)  T(F): 97.8 (15 May 2023 05:29), Max: 97.9 (14 May 2023 13:34)  HR: 70 (15 May 2023 05:29) (64 - 70)  BP: 124/70 (15 May 2023 05:29) (111/74 - 132/77)  BP(mean): --  RR: 18 (15 May 2023 05:29) (18 - 19)  SpO2: 96% (15 May 2023 05:29) (96% - 98%)    Parameters below as of 15 May 2023 05:29  Patient On (Oxygen Delivery Method): room air                REVIEW OF SYSTEMS:    Constitutional: No fever, weight loss or fatigue  Eyes: No eye pain, visual disturbances, or discharge  ENT:  No difficulty hearing, tinnitus, vertigo; No sinus or throat pain  Neck: No pain or stiffness  Respiratory: No, wheezing, chills or hemoptysis  Cardiovascular: No chest pain, palpitations, shortness of breath, dizziness or leg swelling  Gastrointestinal: No abdominal or epigastric pain. No nausea, vomiting or hematemesis; No diarrhea or constipation. No melena or hematochezia.  Genitourinary: No dysuria, frequency, hematuria or incontinence  Neurological: No headaches,  Psychiatric: No  mood swings or difficulty sleeping  Musculoskeletal: No joint pain or swelling; No muscle, back or extremity pain  Skin: No itching, burning, rashes or lesions   Lymph Nodes: No enlarged glands  Endocrine: No heat or cold intolerance; No hair loss,   Allergy and Immunologic: No hives or eczema    On Neurological Examination:    Mental Status - Pt is alert, awake, oriented X2.  Follows commands well and able to answer questions appropriately.Mood and affect  normal    Speech -  Normal.     Cranial Nerves - Pupils 3 mm equal and reactive to light, extraocular eye movements intact. Pt has no visual field deficit.  Pt has no facial asymmetry. Facial sensation is intact.Tongue - is in midline.    Muscle tone - is pradeep     Motor Exam - 5/5 of UE  LE 4/5  No drift. No shaking or tremors.    Sensory Exam - Pt withdraws all extremities equally on stimulation. No asymmetry seen. No complaints of tingling, numbness    coordination:    Finger to nose: normal        Deep tendon Reflexes - 2 plus all over.         Neck Supple -  Yes.     MEDICATIONS  (STANDING):  allopurinol 300 milliGRAM(s) Oral daily  ascorbic acid 500 milliGRAM(s) Oral two times a day  atorvastatin 10 milliGRAM(s) Oral at bedtime  colchicine 0.6 milliGRAM(s) Oral daily  epoetin jane-epbx (RETACRIT) Injectable 25082 Unit(s) SubCutaneous <User Schedule>  famotidine    Tablet 20 milliGRAM(s) Oral daily  melatonin 5 milliGRAM(s) Oral at bedtime  memantine 10 milliGRAM(s) Oral daily  metoprolol succinate  milliGRAM(s) Oral daily  multivitamin 1 Tablet(s) Oral daily  nystatin Powder 1 Application(s) Topical two times a day  QUEtiapine 100 milliGRAM(s) Oral at bedtime  QUEtiapine 50 milliGRAM(s) Oral two times a day  senna 1 Tablet(s) Oral at bedtime  sertraline 50 milliGRAM(s) Oral daily  tamsulosin 0.4 milliGRAM(s) Oral at bedtime    MEDICATIONS  (PRN):  acetaminophen     Tablet .. 650 milliGRAM(s) Oral every 6 hours PRN Mild Pain (1 - 3)  bisacodyl Suppository 10 milliGRAM(s) Rectal daily PRN Constipation  LORazepam     Tablet 0.5 milliGRAM(s) Oral two times a day PRN Agitation  magnesium hydroxide Suspension 30 milliLiter(s) Oral daily PRN Constipation      Allergies    No Known Allergies    Intolerances            Hemoglobin A1C:     Vitamin B12     RADIOLOGY    ASSESSMENT AND PLAN:      seen for ams  likely ME  Underlying dementia  schizophrenia  sp sepsis      Continue namenda for dementia  Physical therapy evaluation.  OOB to chair/ambulation with assistance only.  Pain is accessed and addressed.  Would continue to follow.

## 2023-05-15 NOTE — DISCHARGE NOTE PROVIDER - CARE PROVIDER_API CALL
Dilip Nelson  NEUROLOGY  [address] [place] [address]  Phone: [phone]  Fax: [phone]  Follow Up Time:

## 2023-05-15 NOTE — SOCIAL WORK PROGRESS NOTE - NSSWPROGRESSNOTE_GEN_ALL_CORE
pt for dc to [facility] today. ambulance to  pt at 5pm. wife aware and in agreement. No further  services indicated at this time.   plan: [facility] tisha, dc tonight 5pm. nwems.  pt for dc to [facility] today. ambulance to  pt at 5pm. wife aware and in agreement. No further sw services indicated at this time.   plan: [facility] tisha, dc tonight 5pm. nwems.

## 2023-05-15 NOTE — DISCHARGE NOTE PROVIDER - NSDCMRMEDTOKEN_GEN_ALL_CORE_FT
allopurinol 300 mg oral tablet: 1 orally once a day  colchicine 0.6 mg oral capsule: 1 orally once a day  Dulcolax Laxative 10 mg rectal suppository: 1 rectally once a day as needed for  constipation  famotidine 20 mg oral tablet: 1 orally once a day  Fleet Bisacodyl 10 mg rectal enema: 10 rectally once a day as needed for  constipation  Gemtesa 75 mg oral tablet: 1 orally once a day  magnesium hydroxide 400 mg oral tablet, chewable: 1 tab(s) orally once a day as needed for  constipation  memantine 10 mg oral tablet: 1 orally once a day  metoprolol succinate 100 mg oral tablet, extended release: 1 orally once a day  Multiple Vitamins oral capsule: 1 orally once a day  nystatin 100,000 units/g topical cream: Apply topically to affected area 2 times a day to groin  pravastatin 40 mg oral tablet: 1 orally once a day  QUEtiapine 100 mg oral tablet: 1 tab(s) orally once a day (at bedtime)  QUEtiapine 50 mg oral tablet: 1 tab(s) orally 2 times a day  senna (sennosides) 8.6 mg oral tablet: 1 orally once a day  sertraline 50 mg oral tablet: 1 orally once a day  tamsulosin 0.4 mg oral capsule: 1 orally once a day  Vitamin C 500 mg oral capsule: 1 orally 2 times a day

## 2023-05-15 NOTE — PROGRESS NOTE ADULT - REASON FOR ADMISSION
agitation, CKD, anemia

## 2023-05-15 NOTE — PROGRESS NOTE ADULT - ASSESSMENT
76 y/o PMH dementia, HTN, Afib s/p wachsman 2020, GERD, CKD, anxiety, schizophrenia who was admitted for AMS/agitation. Patient calm and cooperative at bedside. sister reports almost back to baseline     A/P:  metabolic encephalopathy   dementia  -+parainfluenza virus : Symptomatic care     - UC showing ESBL proteus. Patient with recent admission at [facility] for UTI/infective stone s/p ureteral stents     - BC NGTD     - Completed course of Invanz.     - psych saw patient. Continue on Seroquel    - Neuro Dr. Ortiz     - Off of 1:1 observation. Mentals status stable   -D/c planning to [facility] in progress       afib     - cont metoprolol      acute on chronic renal disease    - h/o renal osteodystrophy     - cont BMP. avoid nephrotoxic medications     - Cr stable.     anemia    - hgb stable. continue to monitor    - no signs of active bleeding. continue retacrit.     HLD    - cont pravastatin     DVT proph: SCDs    D/w wife at bedside, Mrs Torres, she came to visit him. Updated her about the plan

## 2023-05-15 NOTE — PROGRESS NOTE ADULT - NUTRITIONAL ASSESSMENT
This patient has been assessed with a concern for Malnutrition and has been determined to have a diagnosis/diagnoses of Moderate protein-calorie malnutrition.    This patient is being managed with:   Diet Soft and Bite Sized-  Low Sodium  Supplement Feeding Modality:  Oral  Nepro Cans or Servings Per Day:  1       Frequency:  Two Times a day  Entered: May  4 2023  3:41PM  

## 2023-05-15 NOTE — CHART NOTE - NSCHARTNOTEFT_GEN_A_CORE
Assessment:   Pt seen for nutrition follow up.  Chart reviewed, hospital course noted.     Brief History:   "78 y/o PMH dementia, HTN, Afib s/p wachsman 2020, GERD, CKD, anxiety, schizophrenia who was admitted for AMS/agitation. Patient calm and cooperative at bedside. sister reports almost back to baseline"    Attempted to see pt, working with PT at this time.    Fecal incontinence noted 5/11, on bowel regimen  Renal indices slightly improved    Factors impacting intake: [x] none [ ] nausea  [ ] vomiting [ ] diarrhea [ ] constipation  [ ]chewing problems [ ] swallowing issues  [ ] other:     Diet Prescription: Diet, Soft and Bite Sized:   Low Sodium  Supplement Feeding Modality:  Oral  Nepro Cans or Servings Per Day:  1       Frequency:  Two Times a day (05-04-23 @ 15:41) [Active]      Intake: varies, observed 100% bfst tray completed    Current Weight: 5/4 216.4#, 5/11 213.1#, 5/12 207.6#      Pertinent Medications: MEDICATIONS  (STANDING):  allopurinol 300 milliGRAM(s) Oral daily  ascorbic acid 500 milliGRAM(s) Oral two times a day  atorvastatin 10 milliGRAM(s) Oral at bedtime  colchicine 0.6 milliGRAM(s) Oral daily  epoetin jane-epbx (RETACRIT) Injectable 23007 Unit(s) SubCutaneous <User Schedule>  ertapenem  IVPB      ertapenem  IVPB 1000 milliGRAM(s) IV Intermittent every 24 hours  famotidine    Tablet 20 milliGRAM(s) Oral daily  melatonin 5 milliGRAM(s) Oral at bedtime  memantine 10 milliGRAM(s) Oral daily  metoprolol succinate  milliGRAM(s) Oral daily  multivitamin 1 Tablet(s) Oral daily  nystatin Powder 1 Application(s) Topical two times a day  QUEtiapine 50 milliGRAM(s) Oral two times a day  QUEtiapine 100 milliGRAM(s) Oral at bedtime  senna 1 Tablet(s) Oral at bedtime  sertraline 50 milliGRAM(s) Oral daily  tamsulosin 0.4 milliGRAM(s) Oral at bedtime    MEDICATIONS  (PRN):  acetaminophen     Tablet .. 650 milliGRAM(s) Oral every 6 hours PRN Mild Pain (1 - 3)  bisacodyl Suppository 10 milliGRAM(s) Rectal daily PRN Constipation  LORazepam     Tablet 0.5 milliGRAM(s) Oral two times a day PRN Agitation  magnesium hydroxide Suspension 30 milliLiter(s) Oral daily PRN Constipation    Pertinent Labs: 05-12 Na142 mmol/L Glu 86 mg/dL K+ 4.3 mmol/L Cr  1.70 mg/dL<H> BUN 33 mg/dL<H> 05-12 Alb 2.6 g/dL<L>    Skin:  MAD gluteal area  Edema: 1+ legs    Estimated Needs:   [x] no change since previous assessment on: #/94.3kg  25-30kcal/kg (2357-2829kcal)  1.2-1.4g pro/kg (113-132gm protein)      Previous Nutrition Diagnosis:   [x]  Malnutrition (moderate, chronic)    Nutrition Diagnosis is [x] ongoing  [ ] resolved [ ] not applicable     New Nutrition Diagnosis: [x] not applicable       Interventions:   Recommend  [x] Continue current diet  [ ] Change Diet To:  [ ] Nutrition Supplement  [ ] Nutrition Support  [ ] Other:     Monitoring and Evaluation:   [x] PO intake [ x ] Tolerance to diet prescription [ x ] weights [ x ] labs [ x ] follow up per protocol  [x] other: s/s GI distress, bowel function, skin integrity/ edema
Assessment: Pt seen for nutrition follow-up. Chart reviewed, hospital course noted.     Brief hx: Pt is a "78 y/o PMH dementia, HTN, Afib s/p wachsman 2020, GERD, CKD, anxiety, schizophrenia who was admitted for AMS/agitation. Patient calm and cooperative at bedside. sister reports almost back to baseline."    Visited pt at bedside this am. Pt alert/confused during visit. Pt reports good appetite. Spoke with nursing, pt ate well for breakfast this am. PO intakes % per nursing documentation. Pt feeds self, minimal assistance with tray set-up. Denies chewing/swallowing difficulties with current diet order. Continues on soft/bite size consistency diet as per SLP recommendations on 5/4. Tolerating diet well. No N/V/D/C. +BM 5/7; bowel regimen rx. Pt receiving Nepro bid with good acceptance.     Factors impacting intake: [X] none [ ] nausea  [ ] vomiting [ ] diarrhea [ ] constipation  [ ]chewing problems [ ] swallowing issues  [ ] other:     Diet Prescription: Diet, Soft and Bite Sized:   Low Sodium  Supplement Feeding Modality:  Oral  Nepro Cans or Servings Per Day:  1       Frequency:  Two Times a day (05-04-23 @ 15:41) [Active]    Intake: good    Current Weight: Weight (kg): 77.1 (05-03 @ 07:49), 5/7 224.2# (1+ BL foot edema noted)  % Weight Change    Pertinent Medications: MEDICATIONS  (STANDING):  allopurinol 300 milliGRAM(s) Oral daily  ALPRAZolam 0.25 milliGRAM(s) Oral at bedtime  ascorbic acid 500 milliGRAM(s) Oral two times a day  atorvastatin 10 milliGRAM(s) Oral at bedtime  colchicine 0.6 milliGRAM(s) Oral daily  famotidine    Tablet 20 milliGRAM(s) Oral daily  melatonin 5 milliGRAM(s) Oral at bedtime  memantine 10 milliGRAM(s) Oral daily  metoprolol succinate  milliGRAM(s) Oral daily  multivitamin 1 Tablet(s) Oral daily  nystatin Powder 1 Application(s) Topical two times a day  risperiDONE   Tablet 0.5 milliGRAM(s) Oral three times a day  senna 1 Tablet(s) Oral at bedtime  sertraline 50 milliGRAM(s) Oral daily  tamsulosin 0.4 milliGRAM(s) Oral at bedtime    MEDICATIONS  (PRN):  acetaminophen     Tablet .. 650 milliGRAM(s) Oral every 6 hours PRN Mild Pain (1 - 3)  bisacodyl Suppository 10 milliGRAM(s) Rectal daily PRN Constipation  magnesium hydroxide Suspension 30 milliLiter(s) Oral daily PRN Constipation  OLANZapine Injectable 2.5 milliGRAM(s) IntraMuscular every 8 hours PRN agitation    Pertinent Labs: 05-07 Na141 mmol/L Glu 109 mg/dL<H> K+ 3.7 mmol/L Cr  1.80 mg/dL<H> BUN 24 mg/dL<H> 05-07 Alb 2.7 g/dL<L>    Skin: L hand skin tear    Estimated Needs:   [X] no change since previous assessment: based on #/94.3kg  25-30kcal/kg (2357-2829kcal)  1.2-1.4g pro/kg (113-132gm protein)  [ ] recalculated:     Previous Nutrition Diagnosis:   [ ] Inadequate Energy Intake [ ]Inadequate Oral Intake [ ] Excessive Energy Intake   [ ] Underweight [ ] Increased Nutrient Needs [ ] Overweight/Obesity   [ ] Altered GI Function [ ] Unintended Weight Loss [ ] Food & Nutrition Related Knowledge Deficit [X] Malnutrition (moderate/chronic)    Nutrition Diagnosis is [X] ongoing  [ ] resolved [ ] not applicable     New Nutrition Diagnosis: [X] not applicable     Interventions:   Recommend  [ ] Change Diet To:  [ ] Nutrition Supplement  [ ] Nutrition Support  [X] Other: Continue current diet as ordered; assistance/encouragement at meal times as needed    Monitoring and Evaluation:   [X] PO intake [ x ] Tolerance to diet prescription [ x ] weights [ x ] labs[ x ] follow up per protocol  [X] other: s/s GI distress, bowel function, skin integrity/ edema
Assessment: Pt seen for nutrition follow-up. Chart reviewed, hospital course noted.    Brief hx: Pt is a "76 y/o PMH dementia, HTN, Afib s/p wachsman 2020, GERD, CKD, anxiety, schizophrenia who was admitted for AMS/agitation."    Visited pt at bedside this morning. Pt alert/confused during visit. PO intakes variable; ranging from % per nursing documentation. Total feed. Continues on soft/bite size, low Na diet with Nepro BID. Pt tolerating diet well. No N/V/D/C per chart review. +BM 5/14; bowel regimen rx. D/C planning to [facility].     Factors impacting intake: [ ] none [ ] nausea  [ ] vomiting [ ] diarrhea [ ] constipation  [ ]chewing problems [ ] swallowing issues  [X] other: total feed, hx of dementia    Diet Prescription: Diet, Soft and Bite Sized:   Low Sodium  Supplement Feeding Modality:  Oral  Nepro Cans or Servings Per Day:  1       Frequency:  Two Times a day (05-04-23 @ 15:41) [Active]    Intake: fair    Current Weight: 5/4 216.4#, 5/11 213.1#, 5/12 207.6#, 5/14 209.4# (1+ BL leg edema noted)  % Weight Change    Pertinent Medications: MEDICATIONS  (STANDING):  allopurinol 300 milliGRAM(s) Oral daily  ascorbic acid 500 milliGRAM(s) Oral two times a day  atorvastatin 10 milliGRAM(s) Oral at bedtime  colchicine 0.6 milliGRAM(s) Oral daily  epoetin jane-epbx (RETACRIT) Injectable 43604 Unit(s) SubCutaneous <User Schedule>  famotidine    Tablet 20 milliGRAM(s) Oral daily  melatonin 5 milliGRAM(s) Oral at bedtime  memantine 10 milliGRAM(s) Oral daily  metoprolol succinate  milliGRAM(s) Oral daily  multivitamin 1 Tablet(s) Oral daily  nystatin Powder 1 Application(s) Topical two times a day  QUEtiapine 100 milliGRAM(s) Oral at bedtime  QUEtiapine 50 milliGRAM(s) Oral two times a day  senna 1 Tablet(s) Oral at bedtime  sertraline 50 milliGRAM(s) Oral daily  tamsulosin 0.4 milliGRAM(s) Oral at bedtime    MEDICATIONS  (PRN):  acetaminophen     Tablet .. 650 milliGRAM(s) Oral every 6 hours PRN Mild Pain (1 - 3)  bisacodyl Suppository 10 milliGRAM(s) Rectal daily PRN Constipation  LORazepam     Tablet 0.5 milliGRAM(s) Oral two times a day PRN Agitation  magnesium hydroxide Suspension 30 milliLiter(s) Oral daily PRN Constipation    Pertinent Labs: 05-15 Na144 mmol/L Glu 99 mg/dL K+ 4.0 mmol/L Cr  1.90 mg/dL<H> BUN 39 mg/dL<H> 05-12 Alb 2.6 g/dL<L>    Skin: L hand skin tear    Estimated Needs:   [X] no change since previous assessment: #/94.3kg  25-30kcal/kg (2357-2829kcal)  1.2-1.4g pro/kg (113-132gm protein)  [ ] recalculated:     Previous Nutrition Diagnosis:   [ ] Inadequate Energy Intake [ ]Inadequate Oral Intake [ ] Excessive Energy Intake   [ ] Underweight [ ] Increased Nutrient Needs [ ] Overweight/Obesity   [ ] Altered GI Function [ ] Unintended Weight Loss [ ] Food & Nutrition Related Knowledge Deficit [X] Malnutrition (moderate/chronic)    Nutrition Diagnosis is [X] ongoing  [ ] resolved [ ] not applicable     New Nutrition Diagnosis: [X] not applicable     Interventions:   Recommend  [ ] Change Diet To:  [ ] Nutrition Supplement  [ ] Nutrition Support  [X] Other: Continue current diet as ordered; assistance/encouragement at meal times    Monitoring and Evaluation:   [X] PO intake [ x ] Tolerance to diet prescription [ x ] weights [ x ] labs[ x ] follow up per protocol  [X] other: s/s GI distress, bowel function, skin integrity/ edema
Called by RN for patient with AMS. As per RN patient has been repeatedly getting confused, pulling at lines, trying to get out of bed while calling for her mother. Patient seen and examined at bedside. Pt is confused but was able to reorient the patient. Pt agrees to stay in bed. However, 20-30 min later patient will get confused again and attempt to get out of bed. Pt denies any complaints at this time. Denies fever, chills, CP, SOB, abd pain, N/V/D/C.      T(C): 36.7 (05-05-23 @ 21:13), Max: 36.8 (05-05-23 @ 13:28)  HR: 67 (05-05-23 @ 21:13) (61 - 69)  BP: 112/70 (05-05-23 @ 21:13) (109/62 - 121/83)  RR: 18 (05-05-23 @ 21:13) (18 - 18)  SpO2: 97% (05-05-23 @ 21:13) (97% - 98%)  Wt(kg): --    Physical Exam:  Gen: elderly male, NAD  HEENT: NCAT, PEERLA b/l, EOMI b/l, no conjunctival erythema  Cardio: regular rate and rhythm, +s1s2, no murmurs, rubs, or gallops  Pulm: CTA b/l, no wheezes, rales or rhonchi  Abdomen: soft, nontender, nondistended, +BS x4 quadrants, no guarding  Extremities: no clubbing, cyanosis or edema, +2 pedal pulses  Neuro: AAOx1, moving all 4 extremities, sensation intact  Skin: warm and dry    Assessment/Plan  76 y/o PMH dementia, HTN, Afib s/p wachsman 2020, GERD, CKD, anxiety, schizophrenia who was admitted for AMS/agitation. Patient calm and cooperative at bedside. sister reports almost back to baseline. Called by RN for AMS    - AMS likely secondary to sundowning in setting of dementia and schizophrenia  - has already been given xanax 0.25mg x1 and melatonin at bed time  - ordered another PRN dose of xanax 0.25mg x1 if patient gets confused and attempts to get out of bed again  - continue rispiridone 0.25 mg BID  - RN to call if changes
Called by RN multiple times overnight d/t patients agitation   Received IM Zyprexa 2.5 mg at 8pm along with Melatonin and Risperidone as ordered   2mg IV Haldol was given around 12 am around 30 mins earlier than scheduled d/t severe agitation, patient actively attempting to get out of bed states he is "going to work"   1 hour after the 2 mg IV Haldol was given remained equally agitated, continues to attempt to leave bed states he wants to get up and leave  RN sitting by patient room to closely watch to prevent fall   Will order 0.5 mg Ativan STAT to avoid more qtc prolonging agents for now   EKG ordered for AM as patient too agitated at the moment   RN to call with changes

## 2023-05-15 NOTE — PROGRESS NOTE ADULT - SUBJECTIVE AND OBJECTIVE BOX
Patient is a 77y old  Male who presents with a chief complaint of agitation, CKD, anemia (08 May 2023 14:44)    Patient seen in follow up for CKD 3.        PAST MEDICAL HISTORY:  Prostate enlargement    Arthritis    Arthritis    HLD (hyperlipidemia)    Gout    h/o HTN (hypertension)    Obesity    H/o Cerebral aneurysm    HTN (hypertension)    Atrial fibrillation    Dementia    Schizophrenia      MEDICATIONS  (STANDING):  allopurinol 300 milliGRAM(s) Oral daily  ascorbic acid 500 milliGRAM(s) Oral two times a day  atorvastatin 10 milliGRAM(s) Oral at bedtime  colchicine 0.6 milliGRAM(s) Oral daily  epoetin jane-epbx (RETACRIT) Injectable 10124 Unit(s) SubCutaneous <User Schedule>  famotidine    Tablet 20 milliGRAM(s) Oral daily  melatonin 5 milliGRAM(s) Oral at bedtime  memantine 10 milliGRAM(s) Oral daily  metoprolol succinate  milliGRAM(s) Oral daily  multivitamin 1 Tablet(s) Oral daily  nystatin Powder 1 Application(s) Topical two times a day  QUEtiapine 50 milliGRAM(s) Oral two times a day  QUEtiapine 100 milliGRAM(s) Oral at bedtime  senna 1 Tablet(s) Oral at bedtime  sertraline 50 milliGRAM(s) Oral daily  tamsulosin 0.4 milliGRAM(s) Oral at bedtime    MEDICATIONS  (PRN):  acetaminophen     Tablet .. 650 milliGRAM(s) Oral every 6 hours PRN Mild Pain (1 - 3)  bisacodyl Suppository 10 milliGRAM(s) Rectal daily PRN Constipation  LORazepam     Tablet 0.5 milliGRAM(s) Oral two times a day PRN Agitation  magnesium hydroxide Suspension 30 milliLiter(s) Oral daily PRN Constipation    T(C): 36.4 (05-15-23 @ 12:42), Max: 36.8 (05-13-23 @ 14:02)  HR: 81 (05-15-23 @ 12:42) (61 - 81)  BP: 117/67 (05-15-23 @ 12:42) (111/74 - 138/71)  RR: 18 (05-15-23 @ 12:42)  SpO2: 96% (05-15-23 @ 12:42)  Wt(kg): --  I&O's Detail    14 May 2023 07:01  -  15 May 2023 07:00  --------------------------------------------------------  IN:    IV PiggyBack: 50 mL  Total IN: 50 mL    OUT:  Total OUT: 0 mL    Total NET: 50 mL            PHYSICAL EXAM:  General: No distress  Respiratory: b/l air entry  Cardiovascular: S1 S2  Gastrointestinal: soft  Extremities:  + edema                           LABORATORY:                        9.7    6.94  )-----------( 167      ( 15 May 2023 05:59 )             31.8     05-15    144  |  118<H>  |  39<H>  ----------------------------<  99  4.0   |  23  |  1.90<H>    Ca    8.7      15 May 2023 05:59      Sodium, Serum: 144 mmol/L (05-15 @ 05:59)  Sodium, Serum: 142 mmol/L (05-14 @ 06:03)    Potassium, Serum: 4.0 mmol/L (05-15 @ 05:59)  Potassium, Serum: 3.7 mmol/L (05-14 @ 06:03)    Hemoglobin: 9.7 g/dL (05-15 @ 05:59)  Hemoglobin: 8.8 g/dL (05-13 @ 05:53)    Creatinine, Serum 1.90 (05-15 @ 05:59)  Creatinine, Serum 1.60 (05-14 @ 06:03)  Creatinine, Serum 1.70 (05-13 @ 05:53)

## 2023-05-15 NOTE — PROGRESS NOTE ADULT - SUBJECTIVE AND OBJECTIVE BOX
HILLARY RED is a 77yMale , patient examined and chart reviewed.     INTERVAL HPI/ OVERNIGHT EVENTS:   No events. Confused.  Afebrile. Agitated at times.    PAST MEDICAL & SURGICAL HISTORY:  Prostate enlargement  Arthritis  HLD (hyperlipidemia)  Gout  h/o HTN (hypertension)  Obesity  H/o Cerebral aneurysm  HTN (hypertension)  Atrial fibrillation  Dementia  Schizophrenia  h/o Cerebral aneurysm coiling  2/18/2011 and 7/15/2011 [facility]  h/o Arthroscopy left knee 2004  h/o Arthroscopy right knee 2007  total knee arthroplasty left 3/5/2012  No significant past surgical history        For details regarding the patient's social history, family history, and other miscellaneous elements, please refer the initial infectious diseases consultation and/or the admitting history and physical examination for this admission.    ROS:  Unable to obtain due to : Dementia      No Known Allergies      Current inpatient medications :    ANTIBIOTICS/RELEVANT:    MEDICATIONS  (STANDING):  allopurinol 300 milliGRAM(s) Oral daily  ascorbic acid 500 milliGRAM(s) Oral two times a day  atorvastatin 10 milliGRAM(s) Oral at bedtime  colchicine 0.6 milliGRAM(s) Oral daily  epoetin jane-epbx (RETACRIT) Injectable 94264 Unit(s) SubCutaneous <User Schedule>  famotidine    Tablet 20 milliGRAM(s) Oral daily  melatonin 5 milliGRAM(s) Oral at bedtime  memantine 10 milliGRAM(s) Oral daily  metoprolol succinate  milliGRAM(s) Oral daily  multivitamin 1 Tablet(s) Oral daily  nystatin Powder 1 Application(s) Topical two times a day  QUEtiapine 50 milliGRAM(s) Oral two times a day  QUEtiapine 100 milliGRAM(s) Oral at bedtime  senna 1 Tablet(s) Oral at bedtime  sertraline 50 milliGRAM(s) Oral daily  tamsulosin 0.4 milliGRAM(s) Oral at bedtime    MEDICATIONS  (PRN):  acetaminophen     Tablet .. 650 milliGRAM(s) Oral every 6 hours PRN Mild Pain (1 - 3)  bisacodyl Suppository 10 milliGRAM(s) Rectal daily PRN Constipation  LORazepam     Tablet 0.5 milliGRAM(s) Oral two times a day PRN Agitation  magnesium hydroxide Suspension 30 milliLiter(s) Oral daily PRN Constipation        Objective:  Vital Signs Last 24 Hrs  T(C): 36.4 (15 May 2023 12:42), Max: 36.6 (14 May 2023 20:25)  T(F): 97.6 (15 May 2023 12:42), Max: 97.8 (14 May 2023 20:25)  HR: 81 (15 May 2023 12:42) (70 - 81)  BP: 117/67 (15 May 2023 12:42) (117/67 - 132/77)  RR: 18 (15 May 2023 12:42) (18 - 18)  SpO2: 96% (15 May 2023 12:42) (96% - 98%)    Parameters below as of 15 May 2023 12:42  Patient On (Oxygen Delivery Method): room air      Physical Exam:  General: NAD  Neck: supple, trachea midline  Lungs: clear, no wheeze/rhonchi  Cardiovascular: regular rate and rhythm, S1 S2  Abdomen: soft, nontender,  bowel sounds normal  Neurological: Confused  Skin: no rash  Extremities: no edema        LABS:                        9.7    6.94  )-----------( 167      ( 15 May 2023 05:59 )             31.8   05-15    144  |  118<H>  |  39<H>  ----------------------------<  99  4.0   |  23  |  1.90<H>    Ca    8.7      15 May 2023 05:59    Urinalysis (05.03.23 @ 10:40)   Glucose Qualitative, Urine: Negative   Blood, Urine: Large   pH Urine: 5.0   Color: Yellow   Urine Appearance: Clear   Bilirubin: Negative   Ketone - Urine: Negative   Specific Gravity: 1.010   Protein, Urine: 100   Urobilinogen: Negative   Nitrite: Negative   Leukocyte Esterase Concentration: Moderate   Red Blood Cell - Urine: 11-25 /HPF   White Blood Cell - Urine: 6-10   Bacteria: Occasional   Squamous Epithelial Cells: Occasional      MICROBIOLOGY:  Culture - Urine (05.03.23 @ 10:40)    -  Amikacin: S <=16   -  Amoxicillin/Clavulanic Acid: S <=8/4   -  Ampicillin: R >16 These ampicillin results predict results for amoxicillin   -  Ampicillin/Sulbactam: S <=4/2 Enterobacter, Klebsiella aerogenes, Citrobacter, and Serratia may develop resistance during prolonged therapy (3-4 days)   -  Aztreonam: R >16   -  Cefazolin: R >16 For uncomplicated UTI with K. pneumoniae, E. coli, or P. mirablis: STEPHEN <=16 is sensitive and STEPHEN >=32 is resistant. This also predicts results for oral agents cefaclor, cefdinir, cefpodoxime, cefprozil, cefuroxime axetil, cephalexin and locarbef for uncomplicated UTI. Note that some isolates may be susceptible to these agents while testing resistant to cefazolin.   -  Cefepime: R >16   -  Ceftriaxone: R >32 Enterobacter, Klebsiella aerogenes, Citrobacter, and Serratia may develop resistance during prolonged therapy   -  Cefuroxime: R >16   -  Ciprofloxacin: R >2   -  Ertapenem: S <=0.5   -  Gentamicin: R >8   -  Levofloxacin: R >4   -  Meropenem: S <=1   -  Nitrofurantoin: R >64 Should not be used to treat pyelonephritis   -  Piperacillin/Tazobactam: SDD 16   -  Tobramycin: R >8   -  Trimethoprim/Sulfamethoxazole: R >2/38   Specimen Source: Clean Catch Clean Catch (Midstream)   Culture Results:   10,000 - 49,000 CFU/mL Proteus mirabilis ESBL  10,000 - 49,000 CFU/mL Coag Negative Staphylococcus "Susceptibilities not  performed"   Organism Identification: Proteus mirabilis ESBL   Organism: Proteus mirabilis ESBL   Method Type: STEPHEN    Culture - Blood (05.03.23 @ 08:30)    Specimen Source: .Blood Blood-Peripheral   Culture Results:   No growth to date.    Respiratory Viral Panel with COVID-19 by JOSE (05.03.23 @ 08:30)    Rapid RVP Result: Detected   SARS-CoV-2: NotDetec: This Respiratory Panel uses polymerase chain reaction (PCR) to detect for  adenovirus; coronavirus (HKU1, NL63, 229E, OC43); human metapneumovirus  (hMPV); human enterovirus/rhinovirus (Entero/RV); influenza A; influenza  A/H1; influenza A/H3; influenza A/H1-2009; influenza B; parainfluenza  viruses 1, 2, 3, 4; respiratory syncytial virus; Mycoplasma pneumoniae;  Chlamydophila pneumoniae; and SARS-CoV-2.   Parainfluenza 3 (RapRVP): Detected      RADIOLOGY & ADDITIONAL STUDIES:    ACC: [id number] EXAM:  XR CHEST PORTABLE IMMED 1V   ORDERED BY: KWAN THOMAS     PROCEDURE DATE:  05/03/2023          INTERPRETATION:  AP chest on May 3, 2023 at 9:28 AM. 2 images. Patient   has sepsis and altered mental status.    Heart magnified by technique.    Lungs remain clear.    Chest is similar to August 28, 2012.    IMPRESSION: No acute finding or change.    Assessment :   78 y/o PMH dementia, HTN, Afib s/p wachsman 2020, GERD, CKD, anxiety, schizophrenia  admitted for AMS/agitation. Mental status back to baseline- remains confused.  RVP + parainfluenza.   Urine culture with Proteus ESBL. . No fever WBC wnl.   Worsening confusion and agitation ? sec UTI- doubt   Remains agitated at times    Plan :   Monitor off antibiotics  Sp Ertapenam x 5 days ended 5/14/23  Trend temps and cbc  Asp precautions  Stable from ID standpoint      Continue with present regiment.  Appropriate use of antibiotics and adverse effects reviewed.    > 35 minutes were spent in direct patient care reviewing notes, medications ,labs data/ imaging , discussion with multidisciplinary team.    Thank you for allowing me to participate in care of your patient .    Fatoumata Miguel MD  Infectious Disease  [phone]

## 2023-05-15 NOTE — DISCHARGE NOTE PROVIDER - HOSPITAL COURSE
76 y/o PMH dementia, HTN, Afib s/p [place] 2020, GERD, CKD, anxiety, schizophrenia who was admitted for AMS/agitation probably due to  metabolic encephalopathy and underlying  dementia. Noted to have  +parainfluenza virus and provided  Symptomatic care. Also treated for ESBLI Proteus UTI with IV Invanz. Seen by Neuro and Pscyh. Mentals status has improved with Seroquel. Patient to be discharged to [facility]. [facility]. Physical exam per Note from today    Total discharge time spent 55 minutes, including medication reconciliation, Car coordination.

## 2023-05-15 NOTE — DISCHARGE NOTE PROVIDER - NSDCCPCAREPLAN_GEN_ALL_CORE_FT
PRINCIPAL DISCHARGE DIAGNOSIS  Diagnosis: Altered mental status  Assessment and Plan of Treatment: Continue Seroquel, recommended by Psych  Continue meds per the list  f/u with Psych, Neuro   F/u with all your doctors      SECONDARY DISCHARGE DIAGNOSES  Diagnosis: Parainfluenza  Assessment and Plan of Treatment:

## 2023-05-15 NOTE — DISCHARGE NOTE NURSING/CASE MANAGEMENT/SOCIAL WORK - PATIENT PORTAL LINK FT
You can access the FollowMyHealth Patient Portal offered by [facility] by registering at the following website: [email or web address]. By joining [facility]’s FollowMyHealth portal, you will also be able to view your health information using other applications (apps) compatible with our system.